# Patient Record
Sex: FEMALE | Race: WHITE | NOT HISPANIC OR LATINO | Employment: FULL TIME | URBAN - METROPOLITAN AREA
[De-identification: names, ages, dates, MRNs, and addresses within clinical notes are randomized per-mention and may not be internally consistent; named-entity substitution may affect disease eponyms.]

---

## 2018-05-29 ENCOUNTER — TRANSCRIBE ORDERS (OUTPATIENT)
Dept: PHYSICAL THERAPY | Facility: CLINIC | Age: 38
End: 2018-05-29

## 2018-05-29 ENCOUNTER — EVALUATION (OUTPATIENT)
Dept: PHYSICAL THERAPY | Facility: CLINIC | Age: 38
End: 2018-05-29
Payer: COMMERCIAL

## 2018-05-29 DIAGNOSIS — F07.81 POST CONCUSSION SYNDROME: Primary | ICD-10-CM

## 2018-05-29 DIAGNOSIS — F07.81 POSTCONCUSSION SYNDROME: Primary | ICD-10-CM

## 2018-05-29 DIAGNOSIS — R42 DIZZINESS: ICD-10-CM

## 2018-05-29 PROCEDURE — G8978 MOBILITY CURRENT STATUS: HCPCS | Performed by: PHYSICAL THERAPIST

## 2018-05-29 PROCEDURE — 97162 PT EVAL MOD COMPLEX 30 MIN: CPT | Performed by: PHYSICAL THERAPIST

## 2018-05-29 PROCEDURE — G8979 MOBILITY GOAL STATUS: HCPCS | Performed by: PHYSICAL THERAPIST

## 2018-05-29 RX ORDER — TOPIRAMATE 100 MG/1
100 TABLET, FILM COATED ORAL DAILY
COMMUNITY
End: 2019-02-21 | Stop reason: ALTCHOICE

## 2018-05-29 RX ORDER — NAPROXEN 250 MG/1
250 TABLET ORAL 2 TIMES DAILY PRN
COMMUNITY
End: 2019-02-21 | Stop reason: ALTCHOICE

## 2018-05-29 NOTE — LETTER
May 29, 2018    Josué Gonzalez  [address]  [facility] [address]    Patient: Edvin Deleon   YOB: 1980   Date of Visit: 2018     Encounter Diagnosis     ICD-10-CM    1  Post concussion syndrome F07 81    2  Amanda Daniel        Dear Dr Jesus Kennedy:    Please review the attached Plan of Care from [facility] recent visit  Please verify that you agree therapy should continue by signing the attached document and sending it back to our office  If you have any questions or concerns, please don't hesitate to call  Sincerely,    Nina Chahal, PT      Referring Provider:      I certify that I have read the below Plan of Care and certify the need for these services furnished under this plan of treatment while under my care  Josué Carlos  [address] [place] [address]  VIA Facsimile: [phone]          PT Evaluation     Today's date: 2018  Patient name: Edvin Deleon  : 1980  MRN: [id number]  Referring provider: Edyta Irwin  Dx:   Encounter Diagnosis     ICD-10-CM    1  Post concussion syndrome F07 81    2  Dizziness R42                   Assessment  Impairments: abnormal or restricted ROM, impaired balance, lacks appropriate home exercise program and pain with function  Other impairment: oculomotor and convergence deficits    Assessment details: Pt is a 44y o  year old female coming to outpatient PT with a diagnosis of post concussion syndrome with dizziness  Pt presents with increased HA, neck pain with R UE radicular sx, decreased balance, decreased convergence, (+) oculomotor deficits and overall decreased functional mobility  Pt would benefit from skilled PT services in order to address these deficits and reach maximum level of function  Thank you kindly for the referral!  Will assess inner ear NV  Pt fatigued and developed a HA during oculomotor and balance testing  Understanding of Dx/Px/POC: good   Prognosis: good    Goals  STG's ( 3-4 weeks)  1  Pt will be independent in HEP  2  Improve convergence ability to 6-8 cm  LTG's ( 6- 8 weeks)  1  Improve FOTO score by 10-12 points  2  Decrease neck pain to 1-2/10  3  Improve balance demonstrated by decreased CT- SIB testing  4  Pt will have improved function on oculomotor testing with less sx    Plan  Patient would benefit from: PT eval and skilled physical therapy  Planned modality interventions: TENS, cryotherapy and thermotherapy: hydrocollator packs  Planned therapy interventions: joint mobilization, manual therapy, neuromuscular re-education, functional ROM exercises, flexibility, balance, strengthening, therapeutic exercise and home exercise program  Frequency: 2x week  Duration in weeks: 8  Treatment plan discussed with: patient        Subjective Evaluation    History of Present Illness  Mechanism of injury: Pt reports about a month ago, a piece of a silo fell down and hit her on the head  Pt went to the ER and had a tetanus shot and 13 staples in her head  Pt got up the next day and felt foggy  Pt went to her PCP and was diagnosed with a concussion  Pt was tired, had loss of appetite, loss of energy, HA, decreased balance, neck pain, sleep disturbances and a heavy feeling in her head  Pt has been out of work for one month  Pt has been avoiding screens  Pt has been avoiding reading, and has difficulty focusing and helping her children with their homework  Pt gets a HA with eye movements  Pt had a CAT scan done on 5/11/18  Pt has occasional tingling in R finger tips and elbow  Pt gets tired when going to the grocery store for an hour  Pt started local driving  Pt has photo and phonophoria  Pt's head feels tired and she is not able to sit up unsupported for long time periods  HA's are intermittent, behind her eyes and into her forehead, piercing, 30 minutes in duration  Relief with manual pressure on her head    Work: ; currently not working  Hobbies: exercising at [employer], running, camping, outside activities, gardening  Gait: no abnormalities  Pain  At best pain rating: 3  At worst pain ratin  Location: headache/ neck pain  Quality: dull ache    Social Support  Steps to enter house: yes  3  Stairs in house: yes   10  Lives in: multiple-level home  Lives with: spouse    Treatments  Current treatment: physical therapy  Patient Goals  Patient goal: to regain her balance, decrease her " heavy headedness", decrease fatigue        Objective     Neurological Testing     Sensation   Cervical/Thoracic   Left   Intact: light touch    Right   Intact: light touch    Reflexes   Left   Biceps (C5/C6): normal (2+)  Brachioradialis (C6): normal (2+)  Triceps (C7): normal (2+)    Right   Biceps (C5/C6): normal (2+)  Brachioradialis (C6): normal (2+)  Triceps (C7): normal (2+)    Active Range of Motion   Cervical/Thoracic Spine   Cervical    Flexion: 22 degrees   Extension: 65 degrees   Left lateral flexion: 15 degrees   Right lateral flexion: 15 degrees   Left rotation: 65 degrees   Right rotation: 55 degrees     Additional Active Range of Motion Details  Pt tightness and stretching in cervical spine  Posture: pt sits with rounded shoulders/ forward head position    General Comments     Cervical/Thoracic Comments  Vestibular Examination:    Primary Complaints: dizziness when getting up in the morning, getting out of the shower, when doing a lot of activity/ helping her kids get ready for school, difficulty falling asleep and staying asleep, tinnitus in ears, popping in ears, decreased short term memory   Pt finds she needs to write things down to remember    Exacerbating Factors: motion sickness when riding in the car, nausea, looking up gives HA    Relieving Factors: sitting, resting    Modified CT SIB testing:  EO on firm surface:  2 89  sway index  EC on firm surface: 4 89  sway index  EO on foam surface:  2 34 sway index  EC on form surface:  4 05 sway index    Spontaneous nystagmus room light: normal  Gaze holding nystagmus room light: normal  Skew deviation room light: negative  Smooth pursuits:  vertical:   15 seconds; difficulty to perform; HA   horizontal: 30 seconds; " hard to focus" , slow speed  Horizontal head thrust test: (+) Bilateraly; difficulty staying on target  VOR cancellation: 5 seconds  Near point convergence: 8 cm with headache ( normal 4- 6 cm)  Oculomotor mobility: fair  Sharp Adryan test: negative  Vertebral Artery Test: negaive  [place] Hallpike test: assess NV          Dx: post concussion syndrome   EPOC: 07/24/18  CO-MORBIDITIES: none  PERSONAL FACTORS: special , HA, dizziness,  Precautions: R UE radiculopathy; consider mechanical traction    Daily Treatment Diary     Manual              Cervical distraction             B UT, levator scap, rhomboid MFR                                                        Exercise Diary                           BW UBE             TB rows             TB LPD                          Sidestepping on foam             Tandem walking             Rhomberg EO foam             Rhomberg EC on level surface                          VOR x1 horiz  Plain bg            VOR x1 vert Plain bg            Imaginary targets             Targets: with and without stopping                                                                                               Modalities

## 2018-05-29 NOTE — PROGRESS NOTES
PT Evaluation     Today's date: 2018  Patient name: Rosalyn Eisenmenger  : 1980  MRN: [id number]  Referring provider: Bandar Quesada  Dx:   Encounter Diagnosis     ICD-10-CM    1  Post concussion syndrome F07 81    2  Dizziness R42                   Assessment  Impairments: abnormal or restricted ROM, impaired balance, lacks appropriate home exercise program and pain with function  Other impairment: oculomotor and convergence deficits    Assessment details: Pt is a 44y o  year old female coming to outpatient PT with a diagnosis of post concussion syndrome with dizziness  Pt presents with increased HA, neck pain with R UE radicular sx, decreased balance, decreased convergence, (+) oculomotor deficits and overall decreased functional mobility  Pt would benefit from skilled PT services in order to address these deficits and reach maximum level of function  Thank you kindly for the referral!  Will assess inner ear NV  Pt fatigued and developed a HA during oculomotor and balance testing  Understanding of Dx/Px/POC: good   Prognosis: good    Goals  STG's ( 3-4 weeks)  1  Pt will be independent in HEP  2  Improve convergence ability to 6-8 cm  LTG's ( 6- 8 weeks)  1  Improve FOTO score by 10-12 points  2  Decrease neck pain to 1-2/10  3  Improve balance demonstrated by decreased CT- SIB testing  4   Pt will have improved function on oculomotor testing with less sx    Plan  Patient would benefit from: PT eval and skilled physical therapy  Planned modality interventions: TENS, cryotherapy and thermotherapy: hydrocollator packs  Planned therapy interventions: joint mobilization, manual therapy, neuromuscular re-education, functional ROM exercises, flexibility, balance, strengthening, therapeutic exercise and home exercise program  Frequency: 2x week  Duration in weeks: 8  Treatment plan discussed with: patient        Subjective Evaluation    History of Present Illness  Mechanism of injury: Pt reports about a month ago, a piece of a silo fell down and hit her on the head  Pt went to the ER and had a tetanus shot and 13 staples in her head  Pt got up the next day and felt foggy  Pt went to her PCP and was diagnosed with a concussion  Pt was tired, had loss of appetite, loss of energy, HA, decreased balance, neck pain, sleep disturbances and a heavy feeling in her head  Pt has been out of work for one month  Pt has been avoiding screens  Pt has been avoiding reading, and has difficulty focusing and helping her children with their homework  Pt gets a HA with eye movements  Pt had a CAT scan done on 18  Pt has occasional tingling in R finger tips and elbow  Pt gets tired when going to the grocery store for an hour  Pt started local driving  Pt has photo and phonophoria  Pt's head feels tired and she is not able to sit up unsupported for long time periods  HA's are intermittent, behind her eyes and into her forehead, piercing, 30 minutes in duration  Relief with manual pressure on her head    Work: ; currently not working  Hobbies: exercising at [employer], running, camping, outside activities, gardening  Gait: no abnormalities  Pain  At best pain rating: 3  At worst pain ratin  Location: headache/ neck pain  Quality: dull ache    Social Support  Steps to enter house: yes  3  Stairs in house: yes   10  Lives in: multiple-level home  Lives with: spouse    Treatments  Current treatment: physical therapy  Patient Goals  Patient goal: to regain her balance, decrease her " heavy headedness", decrease fatigue        Objective     Neurological Testing     Sensation   Cervical/Thoracic   Left   Intact: light touch    Right   Intact: light touch    Reflexes   Left   Biceps (C5/C6): normal (2+)  Brachioradialis (C6): normal (2+)  Triceps (C7): normal (2+)    Right   Biceps (C5/C6): normal (2+)  Brachioradialis (C6): normal (2+)  Triceps (C7): normal (2+)    Active Range of Motion   Cervical/Thoracic Spine Cervical    Flexion: 22 degrees   Extension: 65 degrees   Left lateral flexion: 15 degrees   Right lateral flexion: 15 degrees   Left rotation: 65 degrees   Right rotation: 55 degrees     Additional Active Range of Motion Details  Pt tightness and stretching in cervical spine  Posture: pt sits with rounded shoulders/ forward head position    General Comments     Cervical/Thoracic Comments  Vestibular Examination:    Primary Complaints: dizziness when getting up in the morning, getting out of the shower, when doing a lot of activity/ helping her kids get ready for school, difficulty falling asleep and staying asleep, tinnitus in ears, popping in ears, decreased short term memory   Pt finds she needs to write things down to remember    Exacerbating Factors: motion sickness when riding in the car, nausea, looking up gives HA    Relieving Factors: sitting, resting    Modified CT SIB testing:  EO on firm surface:  2 89  sway index  EC on firm surface: 4 89  sway index  EO on foam surface:  2 34 sway index  EC on form surface:  4 05 sway index    Spontaneous nystagmus room light: normal  Gaze holding nystagmus room light: normal  Skew deviation room light: negative  Smooth pursuits:  vertical:   15 seconds; difficulty to perform; HA   horizontal: 30 seconds; " hard to focus" , slow speed  Horizontal head thrust test: (+) Bilateraly; difficulty staying on target  VOR cancellation: 5 seconds  Near point convergence: 8 cm with headache ( normal 4- 6 cm)  Oculomotor mobility: fair  Sharp Adryan test: negative  Vertebral Artery Test: negaive  [place] Hallpike test: assess NV          Dx: post concussion syndrome   EPOC: 07/24/18  CO-MORBIDITIES: none  PERSONAL FACTORS: special , HA, dizziness,  Precautions: R UE radiculopathy; consider mechanical traction    Daily Treatment Diary     Manual              Cervical distraction             B UT, levator scap, rhomboid MFR Exercise Diary                           BW UBE             TB rows             TB LPD                          Sidestepping on foam             Tandem walking             Rhomberg EO foam             Rhomberg EC on level surface                          VOR x1 horiz  Plain bg            VOR x1 vert Plain bg            Imaginary targets             Targets: with and without stopping                                                                                               Modalities

## 2018-05-31 ENCOUNTER — OFFICE VISIT (OUTPATIENT)
Dept: PHYSICAL THERAPY | Facility: CLINIC | Age: 38
End: 2018-05-31
Payer: COMMERCIAL

## 2018-05-31 DIAGNOSIS — R42 DIZZINESS: ICD-10-CM

## 2018-05-31 DIAGNOSIS — F07.81 POST CONCUSSION SYNDROME: Primary | ICD-10-CM

## 2018-05-31 PROCEDURE — 97140 MANUAL THERAPY 1/> REGIONS: CPT

## 2018-05-31 PROCEDURE — 97010 HOT OR COLD PACKS THERAPY: CPT

## 2018-05-31 PROCEDURE — 97110 THERAPEUTIC EXERCISES: CPT

## 2018-05-31 NOTE — PROGRESS NOTES
Daily Note     Today's date: 2018  Patient name: Markham Ormond  : 1980  MRN: [id number]  Referring provider: Theophilus Sandhoff  Dx:   Encounter Diagnosis     ICD-10-CM    1  Post concussion syndrome F07 81    2  Dizziness R42                   Subjective: Pt reports she has had a HA for the last 2 days 6/10  States activity w/ HA causes dizziness  Objective: See treatment diary below      Assessment: Tolerated treatment fair  Limited ex's due to HA this visit  Pt reports HA down to 1/10 post       Plan: Continue per plan of care  Progress treatment as tolerated      Dx: post concussion syndrome   EPOC: 18  CO-MORBIDITIES: none  PERSONAL FACTORS: special , HA, dizziness,  Precautions: R UE radiculopathy; consider mechanical traction    Daily Treatment Diary     Manual              Cervical distraction JK            B UT, levator scap, rhomboid MFR JK                                       '                Exercise Diary                           BW UBE 3 retro            TB rows OTB 10x            TB LPD OTB  x10                         Sidestepping on foam             Tandem walking             Rhomberg EO foam             Rhomberg EC on level surface                          VOR x1 horiz  Plain bg            VOR x1 vert Plain bg            Imaginary targets             Targets: with and without stopping                                                                                               Modalities              CP  supine 10'

## 2018-06-05 ENCOUNTER — OFFICE VISIT (OUTPATIENT)
Dept: PHYSICAL THERAPY | Facility: CLINIC | Age: 38
End: 2018-06-05
Payer: COMMERCIAL

## 2018-06-05 DIAGNOSIS — F07.81 POST CONCUSSION SYNDROME: Primary | ICD-10-CM

## 2018-06-05 DIAGNOSIS — R42 DIZZINESS: ICD-10-CM

## 2018-06-05 PROCEDURE — 97112 NEUROMUSCULAR REEDUCATION: CPT

## 2018-06-05 PROCEDURE — 97010 HOT OR COLD PACKS THERAPY: CPT

## 2018-06-05 PROCEDURE — 97140 MANUAL THERAPY 1/> REGIONS: CPT

## 2018-06-05 NOTE — PROGRESS NOTES
Daily Note     Today's date: 2018  Patient name: Marialuisa Ingram  : 1980  MRN: [id number]  Referring provider: Davonte Blackburn  Dx:   Encounter Diagnosis     ICD-10-CM    1  Post concussion syndrome F07 81    2  Dizziness R42                   Subjective: Reports weekend following she felt like she had " a hangover"  No c/o's HA today just fogginess  Objective: See treatment diary below  Added VOR ex's  Assessment: Tolerated treatment fair  Pt had difficulty following direction for VOR ex's  Pt challenged by balance ex's  Plan: Continue per plan of care  Progress treatment as tolerated      Dx: post concussion syndrome   EPOC: 18  CO-MORBIDITIES: none  PERSONAL FACTORS: special , HA, dizziness,  Precautions: R UE radiculopathy; consider mechanical traction    Daily Treatment Diary     Manual             Cervical distraction JK JK           B UT, levator scap, rhomboid MFR JK JK                                      25'                Exercise Diary                          BW UBE 3 retro 3 retro           TB rows OTB 10x OTB  10x           TB LPD OTB  x10 OTB  10x                        Sidestepping on foam  2 laps           Tandem walking  2 laps           Rhomberg EO foam  30"x2           Rhomberg EC on level surface  15"x2                        VOR x1 horiz  Plain bg 30"x1           VOR x1 vert Plain bg 30"x1           Imaginary targets             Targets: with and without stopping                                                                                               Modalities             CP  supine 10' 10'

## 2018-06-07 ENCOUNTER — EVALUATION (OUTPATIENT)
Dept: PHYSICAL THERAPY | Facility: CLINIC | Age: 38
End: 2018-06-07
Payer: COMMERCIAL

## 2018-06-07 DIAGNOSIS — R42 DIZZINESS: ICD-10-CM

## 2018-06-07 DIAGNOSIS — F07.81 POST CONCUSSION SYNDROME: Primary | ICD-10-CM

## 2018-06-07 PROCEDURE — 97110 THERAPEUTIC EXERCISES: CPT | Performed by: PHYSICAL THERAPIST

## 2018-06-07 PROCEDURE — 97112 NEUROMUSCULAR REEDUCATION: CPT | Performed by: PHYSICAL THERAPIST

## 2018-06-07 PROCEDURE — 97140 MANUAL THERAPY 1/> REGIONS: CPT | Performed by: PHYSICAL THERAPIST

## 2018-06-07 NOTE — PROGRESS NOTES
Daily Note     Today's date: 2018  Patient name: Antolin Barone  : 1980  MRN: [id number]  Referring provider: Ivana Manning  Dx:   Encounter Diagnosis     ICD-10-CM    1  Post concussion syndrome F07 81    2  Dizziness R42                   Subjective: Pt woke up with a mild HA on the R side of her temple area of her head  Pt had increased " fogginess" after being at an end of the year pool  2-3/10 post tx      Objective: See treatment diary below      Assessment: Tolerated treatment fair  Patient demonstrated fatigue post treatment      Plan: Continue per plan of care         Dx: post concussion syndrome   EPOC: 18  CO-MORBIDITIES: none  PERSONAL FACTORS: special , HA, dizziness,  Precautions: R UE radiculopathy; consider mechanical traction    Daily Treatment Diary     Manual            Cervical distraction JK JK th          B UT, levator scap, rhomboid MFR JK JK                                      25'                Exercise Diary                         BW UBE 3 retro 3 retro 3'           TB rows OTB 10x OTB  10x OTB 2x10          TB LPD OTB  x10 OTB  10x OTB 2x10          Supine chin tuck   10          Sidestepping on foam  2 laps           Tandem walking  2 laps           Rhomberg EO foam  30"x2 30"x2          Rhomberg EC on level surface  15"x2 15"x2                       VOR x1 horiz  Plain bg 30"x1 15"x2          VOR x1 vert Plain bg 30"x1 15"x2          Imaginary targets   15"x2          Targets: with and without stopping   x5 ea                                                                                            Modalities             CP  supine 10' 10'

## 2018-06-12 ENCOUNTER — OFFICE VISIT (OUTPATIENT)
Dept: PHYSICAL THERAPY | Facility: CLINIC | Age: 38
End: 2018-06-12
Payer: COMMERCIAL

## 2018-06-12 DIAGNOSIS — R42 DIZZINESS: ICD-10-CM

## 2018-06-12 DIAGNOSIS — F07.81 POST CONCUSSION SYNDROME: Primary | ICD-10-CM

## 2018-06-12 PROCEDURE — 97112 NEUROMUSCULAR REEDUCATION: CPT | Performed by: PHYSICAL THERAPIST

## 2018-06-12 PROCEDURE — 97140 MANUAL THERAPY 1/> REGIONS: CPT | Performed by: PHYSICAL THERAPIST

## 2018-06-12 NOTE — PROGRESS NOTES
Daily Note     Today's date: 2018  Patient name: Michael Sotelo  : 1980  MRN: [id number]  Referring provider: Adriano Narayan  Dx:   Encounter Diagnosis     ICD-10-CM    1  Post concussion syndrome F07 81    2  Dizziness R42                   Subjective: Pt reports she is pushing herself to do more, but she is " feeling it"  Pt drove a farther distance to the bank this week and did some other errands in the am, but then she felt more " heavy headed"  Pt reports less R UE radicular sx and less face pain      Objective: See treatment diary below      Assessment: Tolerated treatment well  Patient demonstrated fatigue post treatment  Pt has noticed short term memory problems with activities at home when in conversation  Pt continues to be challenged with oculomotor ex and gets eye pain/ temple pain  " Ice cream HA"  Plan: Continue per plan of care   Try starting with balance ex NV    Dx: post concussion syndrome   EPOC: 18  CO-MORBIDITIES: none  PERSONAL FACTORS: special , HA, dizziness,  Precautions: R UE radiculopathy; consider mechanical traction    Daily Treatment Diary     Manual           Cervical distraction Burton Keel th th         B UT, levator scap, rhomboid MFR JK JK  th                                    25'                Exercise Diary                        BW UBE 3 retro 3 retro 3'  3' BW         TB rows OTB 10x OTB  10x OTB 2x10 OTB 2x10         TB LPD OTB  x10 OTB  10x OTB 2x10 OTB 2x10         Supine chin tuck   10 10         Sidestepping on foam  2 laps           Tandem walking  2 laps           Rhomberg EO foam  30"x2 30"x2          Rhomberg EC on level surface  15"x2 15"x2                       VOR x1 horiz  Plain bg 30"x1 15"x2 15" x2         VOR x1 vert Plain bg 30"x1 15"x2 15" x2         Imaginary targets   15"x2 15 " x2         Targets: with and without stopping   x5 ea x5 Modalities  5/31 6/5           CP  supine 10' 10'

## 2018-06-14 ENCOUNTER — OFFICE VISIT (OUTPATIENT)
Dept: PHYSICAL THERAPY | Facility: CLINIC | Age: 38
End: 2018-06-14
Payer: COMMERCIAL

## 2018-06-14 DIAGNOSIS — R42 DIZZINESS: ICD-10-CM

## 2018-06-14 DIAGNOSIS — F07.81 POST CONCUSSION SYNDROME: Primary | ICD-10-CM

## 2018-06-14 PROCEDURE — 97112 NEUROMUSCULAR REEDUCATION: CPT

## 2018-06-14 PROCEDURE — 97140 MANUAL THERAPY 1/> REGIONS: CPT

## 2018-06-14 PROCEDURE — 97110 THERAPEUTIC EXERCISES: CPT

## 2018-06-14 NOTE — PROGRESS NOTES
Daily Note     Today's date: 2018  Patient name: Devon Diego  : 1980  MRN: [id number]  Referring provider: Tyler De La Torre  Dx:   Encounter Diagnosis     ICD-10-CM    1  Post concussion syndrome F07 81    2  Dizziness R42          Subjective:   Patient reported no new complaints  Objective: See treatment diary below      Assessment: Patient demonstrated good tolerance to performing balance exercises first, MT, then UBE and bands with no complaints at end of treatment  Discussed with patient to perform eye exercises at home depending on how she feels throughout the rest of the day  MFR helped to decrease fascia restrictions  Patient would benefit from continued PT      Plan: Continue per plan of care  Patient will monitor how she feels and let therapist know next visit       Dx: post concussion syndrome   EPOC: 18  CO-MORBIDITIES: none  PERSONAL FACTORS: special , HA, dizziness,  Precautions: R UE radiculopathy; consider mechanical traction    Daily Treatment Diary     Manual          Cervical distraction Johanny Barros  th af        B UT, levator scap, rhomboid MFR [facility] Af                                    25'                Exercise Diary                       BW UBE 3 retro 3 retro 3'  3' BW 3' BW        TB rows OTB 10x OTB  10x OTB 2x10 OTB 2x10 OTB 2x10        TB LPD OTB  x10 OTB  10x OTB 2x10 OTB 2x10 OTB 2x10        Supine chin tuck   10 10         Sidestepping on foam  2 laps   2 laps        Tandem walking  2 laps   2 laps        Rhomberg EO foam  30"x2 30"x2  30"x2        Rhomberg EC on level surface  15"x2 15"x2  15"x2                     VOR x1 horiz  Plain bg 30"x1 15"x2 15" x2         VOR x1 vert Plain bg 30"x1 15"x2 15" x2         Imaginary targets   15"x2 15 " x2         Targets: with and without stopping   x5 ea x5                                                                                           Modalities   CP  supine 10' 10'

## 2018-06-19 ENCOUNTER — OFFICE VISIT (OUTPATIENT)
Dept: PHYSICAL THERAPY | Facility: CLINIC | Age: 38
End: 2018-06-19
Payer: COMMERCIAL

## 2018-06-19 DIAGNOSIS — F07.81 POST CONCUSSION SYNDROME: Primary | ICD-10-CM

## 2018-06-19 DIAGNOSIS — R42 DIZZINESS: ICD-10-CM

## 2018-06-19 PROCEDURE — 97110 THERAPEUTIC EXERCISES: CPT

## 2018-06-19 PROCEDURE — 97112 NEUROMUSCULAR REEDUCATION: CPT

## 2018-06-19 PROCEDURE — 97140 MANUAL THERAPY 1/> REGIONS: CPT

## 2018-06-19 NOTE — PROGRESS NOTES
Daily Note     Today's date: 2018  Patient name: Devon Diego  : 1980  MRN: [id number]  Referring provider: Tyler De La Torre  Dx:   Encounter Diagnosis     ICD-10-CM    1  Post concussion syndrome F07 80    2  Dizziness R42                   Subjective: Patient noted that she has a pressure feeling on forehead superior lateral to B eyes that comes and goes  Patient noted that she has a neurologist appointment scheduled for after therapy today  Patient noted no change in sx's or pressure on forehead post treatment  Objective: See treatment diary below      Assessment: Patient had some questions on oculomotor exercises therefore reviewed exercises in treatment  After patient performed vertical VORx1 she noted slight neck pain  Trailed balance exercises first due to patient noting that she felt ok after last treatment with exercises and order of exercises  Trailed adding oculomotor exercises back into treatment  Patient would benefit from continued PT      Plan: Continue per plan of care  Patient will monitor and let therapist know how she feels NV       Dx: post concussion syndrome   EPOC: 18  CO-MORBIDITIES: none  PERSONAL FACTORS: special , HA, dizziness,  Precautions: R UE radiculopathy; consider mechanical traction    Daily Treatment Diary     Manual         Cervical distraction JK JK th th af af       B UT, levator scap, rhomboid MFR JK JK th th Af  af                                  25'                Exercise Diary                      BW UBE 3 retro 3 retro 3'  3' BW 3' BW        TB rows OTB 10x OTB  10x OTB 2x10 OTB 2x10 OTB 2x10        TB LPD OTB  x10 OTB  10x OTB 2x10 OTB 2x10 OTB 2x10        Supine chin tuck   10 10         Sidestepping on foam  2 laps   2 laps 3 laps       Tandem walking  2 laps   2 laps 3laps       Rhomberg EO foam  30"x2 30"x2  30"x2 30"x2       Rhomberg EC on level surface  15"x2 15"x2  15"x2 15"x2                    VOR x1 horiz  Plain bg 30"x1 15"x2 15" x2  15"x2       VOR x1 vert Plain bg 30"x1 15"x2 15" x2  15"x2       Imaginary targets   15"x2 15 " x2  15''x2       Targets: with and without stopping   x5 ea x5                                                                                           Modalities  5/31 6/5           CP  supine 10' 10'

## 2018-06-21 ENCOUNTER — OFFICE VISIT (OUTPATIENT)
Dept: PHYSICAL THERAPY | Facility: CLINIC | Age: 38
End: 2018-06-21
Payer: COMMERCIAL

## 2018-06-21 DIAGNOSIS — R42 DIZZINESS: ICD-10-CM

## 2018-06-21 DIAGNOSIS — F07.81 POST CONCUSSION SYNDROME: Primary | ICD-10-CM

## 2018-06-21 PROCEDURE — 97140 MANUAL THERAPY 1/> REGIONS: CPT

## 2018-06-21 PROCEDURE — 97112 NEUROMUSCULAR REEDUCATION: CPT

## 2018-06-21 PROCEDURE — 97012 MECHANICAL TRACTION THERAPY: CPT

## 2018-06-21 NOTE — PROGRESS NOTES
Daily Note     Today's date: 2018  Patient name: Regine Lane  : 1980  MRN: [id number]  Referring provider: Charles Feliz  Dx:   Encounter Diagnosis     ICD-10-CM    1  Post concussion syndrome F07 80    2  Dizziness R42                   Subjective: Pt reports she did not get the answers she needed from her last neurologist appt  Reports when reading w/ her son she gets a HA from looking down and then gets tingling in the face and a sharp pain at the temples  States if she pushes herself too hard she is spent for the rest of the day and then next feels "like a hang over"  Objective: See treatment diary below  Trial mech tx  Assessment: Tolerated treatment fair  Patient c/o dizziness w/ VOR ex's  Pt challenged w/ VOR ex's today  Performs small AROM w/ head turns  Plan: Continue per plan of care  Progress treatment as tolerated      Dx: post concussion syndrome   EPOC: 18  CO-MORBIDITIES: none  PERSONAL FACTORS: special , HA, dizziness,  Precautions: R UE radiculopathy; consider mechanical traction    Daily Treatment Diary     Manual        Cervical distraction JK JK th th af af JK      B UT, levator scap, rhomboid MFR JK JK th th Af  af JK                                 25'      20'          Exercise Diary                     BW UBE 3 retro 3 retro 3'  3' BW 3' BW  3'      TB rows OTB 10x OTB  10x OTB 2x10 OTB 2x10 OTB 2x10  OTB  20      TB LPD OTB  x10 OTB  10x OTB 2x10 OTB 2x10 OTB 2x10  OTB  20      Supine chin tuck   10 10         Sidestepping on foam  2 laps   2 laps 3 laps 3 laps      Tandem walking  2 laps   2 laps 3laps 3 laps      Rhomberg EO foam  30"x2 30"x2  30"x2 30"x2       Rhomberg EC on level surface  15"x2 15"x2  15"x2 15"x2                    VOR x1 horiz  Plain bg 30"x1 15"x2 15" x2  15"x2 30"x2      VOR x1 vert Plain bg 30"x1 15"x2 15" x2  15"x2 30"x2      Imaginary targets   15"x2 15 " x2  15''x2       Targets: with and without stopping   x5 ea x5                                                                                           Modalities  5/31 6/5 6/21          CP  supine 10' 10'           mech TX cerv   5'x2

## 2018-06-26 ENCOUNTER — OFFICE VISIT (OUTPATIENT)
Dept: PHYSICAL THERAPY | Facility: CLINIC | Age: 38
End: 2018-06-26
Payer: COMMERCIAL

## 2018-06-26 DIAGNOSIS — F07.81 POST CONCUSSION SYNDROME: Primary | ICD-10-CM

## 2018-06-26 DIAGNOSIS — R42 DIZZINESS: ICD-10-CM

## 2018-06-26 PROCEDURE — G8978 MOBILITY CURRENT STATUS: HCPCS | Performed by: PHYSICAL THERAPIST

## 2018-06-26 PROCEDURE — 97110 THERAPEUTIC EXERCISES: CPT | Performed by: PHYSICAL THERAPIST

## 2018-06-26 PROCEDURE — G8979 MOBILITY GOAL STATUS: HCPCS | Performed by: PHYSICAL THERAPIST

## 2018-06-26 PROCEDURE — 97112 NEUROMUSCULAR REEDUCATION: CPT | Performed by: PHYSICAL THERAPIST

## 2018-06-26 PROCEDURE — 97140 MANUAL THERAPY 1/> REGIONS: CPT | Performed by: PHYSICAL THERAPIST

## 2018-06-26 NOTE — PROGRESS NOTES
Daily Note     Today's date: 2018  Patient name: Devon Diego  : 1980  MRN: [id number]  Referring provider: Tyler De La Torre  Dx:   Encounter Diagnosis     ICD-10-CM    1  Post concussion syndrome F07 80    2  Dizziness R42                   Subjective: Pt drove 7 miles to her work last week, and started to get eye pain  Pt is gradually starting to resume more activities at home and with her children, however she feels exhausted at the end of the day and goes to bed around 6:30/ 7pm  Pt was busy last week, Monday through Thursday with activities with her children, and felt like she needed to " crash" on Friday  Pt notes increased difficulty focusing her eyes when reading to her son  Pt was at another pool party with her son and lasted about 2 hours, before she needed to return home  Objective: See treatment diary below      Assessment: Tolerated treatment fair  Patient demonstrated fatigue post treatment      Plan: Continue per plan of care       Dx: post concussion syndrome   EPOC: 18  CO-MORBIDITIES: none  PERSONAL FACTORS: special , HA, dizziness,  Precautions: R UE radiculopathy; consider mechanical traction    Daily Treatment Diary     Manual       Cervical distraction JK JK th th af af JK th     B UT, levator scap, rhomboid MFR JK JK th th Af  af JK th     Manual stretching c spine        th                   25'      20'          Exercise Diary                    BW UBE 3 retro 3 retro 3'  3' BW 3' BW  3' 3'     TB rows OTB 10x OTB  10x OTB 2x10 OTB 2x10 OTB 2x10  OTB  20 OTB 20     TB LPD OTB  x10 OTB  10x OTB 2x10 OTB 2x10 OTB 2x10  OTB  20 OTB x20     Supine chin tuck   10 10         Sidestepping on foam  2 laps   2 laps 3 laps 3 laps 3 laps     Tandem walking  2 laps   2 laps 3laps 3 laps 3 laps     Rhomberg EO foam  30"x2 30"x2  30"x2 30"x2       Rhomberg EC on level surface  15"x2 15"x2  15"x2 15"x2 VOR x1 horiz  Plain bg 30"x1 15"x2 15" x2  15"x2 30"x2      VOR x1 vert Plain bg 30"x1 15"x2 15" x2  15"x2 30"x2      Imaginary targets   15"x2 15 " x2  15''x2       Targets: with and without stopping   x5 ea x5                                                                                           Modalities  5/31 6/5 6/21          CP  supine 10' 10'           mech TX cerv   5'x2

## 2018-06-27 ENCOUNTER — TELEPHONE (OUTPATIENT)
Dept: NEUROLOGY | Facility: CLINIC | Age: 38
End: 2018-06-27

## 2018-06-28 ENCOUNTER — OFFICE VISIT (OUTPATIENT)
Dept: PHYSICAL THERAPY | Facility: CLINIC | Age: 38
End: 2018-06-28
Payer: COMMERCIAL

## 2018-06-28 DIAGNOSIS — F07.81 POST CONCUSSION SYNDROME: Primary | ICD-10-CM

## 2018-06-28 DIAGNOSIS — R42 DIZZINESS: ICD-10-CM

## 2018-06-28 PROCEDURE — 97140 MANUAL THERAPY 1/> REGIONS: CPT

## 2018-06-28 PROCEDURE — 97112 NEUROMUSCULAR REEDUCATION: CPT

## 2018-06-28 NOTE — PROGRESS NOTES
Daily Note     Today's date: 2018  Patient name: Mitzi Nicholson  : 1980  MRN: [id number]  Referring provider: Griffin Bernard  Dx:   Encounter Diagnosis     ICD-10-CM    1  Post concussion syndrome F07 80    2  Dizziness R42                   Subjective: Pt reports she still gets neck pain and the occasional tingling in her face  Reports concerned about trigger pt injections  Objective: See treatment diary below  Gave pt 3 words to remember at end of session  Assessment: Tolerated treatment fair  Patient demonstrated fatigue post treatment and would benefit from continued PT  Pt still having trouble following directions  Pt remembered 2/3 words given to her in beginning of session  Plan: Continue per plan of care     Dx: post concussion syndrome   EPOC: 18  CO-MORBIDITIES: none  PERSONAL FACTORS: special , HA, dizziness,  Precautions: R UE radiculopathy; consider mechanical traction    Daily Treatment Diary     Manual   6    Cervical distraction JK JK th th af af JK th Jk    B UT, levator scap, rhomboid MFR JK JK th th Af  af JK th JK    Manual stretching c spine        th                   25'      20'  15'        Exercise Diary                   BW UBE 3 retro 3 retro 3'  3' BW 3' BW  3' 3' 4'    TB rows OTB 10x OTB  10x OTB 2x10 OTB 2x10 OTB 2x10  OTB  20 OTB 20  otb  20    TB LPD OTB  x10 OTB  10x OTB 2x10 OTB 2x10 OTB 2x10  OTB  20 OTB x20 OTB  20x    Supine chin tuck   10 10         Sidestepping on foam  2 laps   2 laps 3 laps 3 laps 3 laps 3 laps    Tandem walking  2 laps   2 laps 3laps 3 laps 3 laps 3 laps    Rhomberg EO foam  30"x2 30"x2  30"x2 30"x2   30"x2    Rhomberg EC on level surface  15"x2 15"x2  15"x2 15"x2   30"x1  30"x1  foam                 VOR x1 horiz  Plain bg 30"x1 15"x2 15" x2  15"x2 30"x2  30"x2    VOR x1 vert Plain bg 30"x1 15"x2 15" x2  15"x2 30"x2  30"x2 Imaginary targets   15"x2 15 " x2  15''x2   30"x2    Targets: with and without stopping   x5 ea x5                                                                                           Modalities  5/31 6/5 6/21 6/28         CP  supine 10' 10'           mech TX cerv   5'x2 5'x2

## 2018-07-03 ENCOUNTER — OFFICE VISIT (OUTPATIENT)
Dept: PHYSICAL THERAPY | Facility: CLINIC | Age: 38
End: 2018-07-03
Payer: COMMERCIAL

## 2018-07-03 DIAGNOSIS — F07.81 POST CONCUSSION SYNDROME: Primary | ICD-10-CM

## 2018-07-03 DIAGNOSIS — R42 DIZZINESS: ICD-10-CM

## 2018-07-03 PROCEDURE — 97012 MECHANICAL TRACTION THERAPY: CPT

## 2018-07-03 PROCEDURE — 97140 MANUAL THERAPY 1/> REGIONS: CPT

## 2018-07-03 PROCEDURE — 97112 NEUROMUSCULAR REEDUCATION: CPT

## 2018-07-03 NOTE — PROGRESS NOTES
Daily Note     Today's date: 7/3/2018  Patient name: Charlene Mendoza  : 1980  MRN: [id number]  Referring provider: Berry Gr  Dx:   Encounter Diagnosis     ICD-10-CM    1  Post concussion syndrome F07 80    2  Dizziness R42                   Subjective: Pt reports she thinks the [facility] Tx might be helping  Reports not having the eye pain as frequently  States she is having tingling yet  Pt states she has had low motivation lately  Objective: See treatment diary below      Assessment: Tolerated treatment fair  Patient sill having trouble w/ direction following  Pt w/ improved balance today  Plan: Continue per plan of care  Progress treatment as tolerated      Dx: post concussion syndrome   EPOC: 18  CO-MORBIDITIES: none  PERSONAL FACTORS: special , HA, dizziness,  Precautions: R UE radiculopathy; consider mechanical traction    Daily Treatment Diary     Manual   6 72   Cervical distraction JK JK th th af af JK th Jk JK   B UT, levator scap, rhomboid MFR JK JK th th Af  af JK th JK JK   Manual stretching c spine        th                   25'      20'  15' 15'       Exercise Diary   6 7/3                BW UBE 3 retro 3 retro 3'  3' BW 3' BW  3' 3' 4' 4'   TB rows OTB 10x OTB  10x OTB 2x10 OTB 2x10 OTB 2x10  OTB  20 OTB 20  otb  20 OTB  20   TB LPD OTB  x10 OTB  10x OTB 2x10 OTB 2x10 OTB 2x10  OTB  20 OTB x20 OTB  20x OTb  20x   Supine chin tuck   10 10         Sidestepping on foam  2 laps   2 laps 3 laps 3 laps 3 laps 3 laps 3 laps   Tandem walking  2 laps   2 laps 3laps 3 laps 3 laps 3 laps 3 laps   Rhomberg EO foam  30"x2 30"x2  30"x2 30"x2   30"x2 30"x2   Rhomberg EC on level surface  15"x2 15"x2  15"x2 15"x2   30"x1  30"x1  foam 30"x2  foam                VOR x1 horiz  Plain bg 30"x1 15"x2 15" x2  15"x2 30"x2  30"x2 30"x2   VOR x1 vert Plain bg 30"x1 15"x2 15" x2  15"x2 30"x2  30"x2 30"x2 Imaginary targets   15"x2 15 " x2  15''x2   30"x2 30"x2   Targets: with and without stopping   x5 ea x5                                                                                           Modalities  5/31 6/5 6/21 6/28 7/3        CP  supine 10' 10'           mech TX cerv   5'x2 5'x2 5'x2

## 2018-07-05 ENCOUNTER — TRANSCRIBE ORDERS (OUTPATIENT)
Dept: PHYSICAL THERAPY | Facility: CLINIC | Age: 38
End: 2018-07-05

## 2018-07-05 ENCOUNTER — OFFICE VISIT (OUTPATIENT)
Dept: PHYSICAL THERAPY | Facility: CLINIC | Age: 38
End: 2018-07-05
Payer: COMMERCIAL

## 2018-07-05 DIAGNOSIS — R20.0 TACTILE ANESTHESIA: ICD-10-CM

## 2018-07-05 DIAGNOSIS — R42 DIZZINESS: ICD-10-CM

## 2018-07-05 DIAGNOSIS — F07.81 POST CONCUSSION SYNDROME: Primary | ICD-10-CM

## 2018-07-05 DIAGNOSIS — R51.9 FACIAL PAIN: ICD-10-CM

## 2018-07-05 PROCEDURE — 97012 MECHANICAL TRACTION THERAPY: CPT | Performed by: PHYSICAL THERAPIST

## 2018-07-05 PROCEDURE — 97110 THERAPEUTIC EXERCISES: CPT | Performed by: PHYSICAL THERAPIST

## 2018-07-05 PROCEDURE — 97140 MANUAL THERAPY 1/> REGIONS: CPT | Performed by: PHYSICAL THERAPIST

## 2018-07-05 NOTE — PROGRESS NOTES
PT Re-Evaluation     Today's date: 2018  Patient name: Rebeca Newman  : 1980  MRN: [id number]  Referring provider: Ching Callejas  Dx:   Encounter Diagnosis     ICD-10-CM    1  Post concussion syndrome F07 80    2  Dizziness R42                   Assessment  Impairments: abnormal or restricted ROM, impaired balance, lacks appropriate home exercise program and pain with function  Other impairment: oculomotor and convergence deficits    Assessment details: Since starting skilled PT, pain levels are decreased at rest, cervical ROM is improving, R UE radicular sx improving, 4 out of 4 balance conditions improving with gradual improvements with oculomotor testing  Recommend pt continue skilled PT  Pt may benefit from a referral to cognitive OT due to dysfunction with reading, convergence, concentrating  Understanding of Dx/Px/POC: good   Prognosis: good    Goals  STG's ( 3-4 weeks)  1  Pt will be independent in HEP- met  2  Improve convergence ability to 6-8 cm- not met  LTG's ( 6- 8 weeks)  1  Improve FOTO score by 10-12 points- partial met  2  Decrease neck pain to 1-2/10- not met  3  Improve balance demonstrated by decreased CT- SIB testing- met  4  Pt will have improved function on oculomotor testing with less sx- partial met    Plan  Patient would benefit from: skilled physical therapy  Planned modality interventions: TENS, cryotherapy, thermotherapy: hydrocollator packs and traction  Planned therapy interventions: joint mobilization, manual therapy, neuromuscular re-education, functional ROM exercises, flexibility, balance, strengthening, therapeutic exercise and home exercise program  Frequency: 2x week  Duration in weeks: 8  Treatment plan discussed with: patient        Subjective Evaluation    History of Present Illness  Mechanism of injury: Pt notes her appetite has improved and is tolerating increased activity around her home   Pt is trying to push through increased activities at home without needing to rest   Light and sound sensitivity is improving, but she is still bothered by certain lights and noise  Pt is trying to read and has difficulty focusing and it gives her a headache  Pt is able to read for 5 minutes or less  Small print can make her vision blurry  Pt notes slight pain in her eye that comes and goes  Pain is lessening in her L eye, but it is still present  Tinnitus sx comes and goes, but is decreased in general   Pt is very concerned about tingling in face  homework  R UE radicular sx are lessening, with tingling in her fingertips    Work: ; currently not working  Hobbies: exercising at [employer], running, camping, outside activities, gardening  Gait: no abnormalities  Pain  At best pain ratin  At worst pain ratin  Location: headache/ neck pain  Quality: dull ache    Social Support  Steps to enter house: yes  3  Stairs in house: yes   10  Lives in: multiple-level home  Lives with: spouse    Treatments  Current treatment: physical therapy  Patient Goals  Patient goal: to regain her balance, decrease her " heavy headedness", decrease fatigue        Objective     Neurological Testing     Sensation   Cervical/Thoracic   Left   Intact: light touch    Right   Intact: light touch    Reflexes   Left   Biceps (C5/C6): normal (2+)  Brachioradialis (C6): normal (2+)  Triceps (C7): normal (2+)    Right   Biceps (C5/C6): normal (2+)  Brachioradialis (C6): normal (2+)  Triceps (C7): normal (2+)    Active Range of Motion   Cervical/Thoracic Spine   Cervical    Flexion: 45 degrees   Extension: 65 degrees with pain  Left lateral flexion: 30 degrees   Right lateral flexion: 29 degrees   Left rotation: WFL  Right rotation: [facility]    Additional Active Range of Motion Details  Pt tightness and stretching in cervical spine  Posture: pt sits with rounded shoulders/ forward head position    General Comments     Cervical/Thoracic Comments  Vestibular Examination:    Primary Complaints: dizziness when bending down to  childrens' toys, getting out of the shower, when doing a lot of activity/ helping her kids get ready for school, facial tingling, tinnitus in ears, popping in ears, decreased short term memory   Pt finds she needs to write things down to remember; L eye pain    Exacerbating Factors: motion sickness when riding in the car, nausea, looking up gives HA    Relieving Factors: sitting, resting    I E: Modified CT SIB testing:  EO on firm surface:  2 89  sway index  EC on firm surface: 4 89  sway index  EO on foam surface:  2 34 sway index  EC on form surface:  4 05 sway index    7/5/18: Modified CT SIB testing  EO on firm surface: 1 98 sway index  EC on firm surface: 2 47 sway index  EO on foam surface: 1 75 sway index  EC on foam surface: 2 64 sway index    Spontaneous nystagmus room light: normal  Gaze holding nystagmus room light: normal  Skew deviation room light: negative  Smooth pursuits:  vertical:   30 seconds;    horizontal: 30 seconds; needed to think about the task  Horizontal head thrust test: (-) caused dizziness  VOR cancellation: 5 seconds  Near point convergence: 8 cm with headache ( normal 4- 6 cm)  Oculomotor mobility: fair  Sharp Adryan test: negative  Vertebral Artery Test: negative  [place]is[place] Hallpike test: negative  Horizontal Roll test: negative          Dx: post concussion syndrome   EPOC: 07/24/18  CO-MORBIDITIES: none  PERSONAL FACTORS: special , HA, dizziness,  Precautions: R UE radiculopathy; consider mechanical traction    Daily Treatment Diary   6/19  Manual  7/5      6/21 6/26 6/28 7/3   Cervical distraction [facility] Jk JK   B UT, levator scap, rhomboid MFR [facility] JK JK   Manual stretching c spine        th                   13'      20'  15' 15'       Exercise Diary  7/5 6/21 6/26 6/28 7/3                BW UBE 4'      3' 3' 4' 4'   TB rows GTB 10x2      OTB  20 OTB 20  otb  20 OTB  20   TB LPD GTB 2  x10      OTB  20 OTB x20 OTB  20x OTb  20x   Supine chin tuck             Sidestepping on foam       3 laps 3 laps 3 laps 3 laps   Tandem walking       3 laps 3 laps 3 laps 3 laps   Rhomberg EO foam         30"x2 30"x2   Rhomberg EC on level surface         30"x1  30"x1  foam 30"x2  foam                VOR x1 horiz          30"x2 30"x2   VOR x1 vert         30"x2 30"x2   Imaginary targets         30"x2 30"x2   Targets: with and without stopping                                                                                               Modalities  7/5            CP  supine             mech TX cerv 5' x2

## 2018-07-05 NOTE — LETTER
2018    [place] [id number]    Patient: Marialuisa Ingram   YOB: 1980   Date of Visit: 2018     Encounter Diagnosis     ICD-10-CM    1  Post concussion syndrome F07 81    2  [address]        Dear Dr Osvaldo Amezcua:    Please review the attached Plan of Care from [facility] recent visit  Please verify that you agree therapy should continue by signing the attached document and sending it back to our office  If you have any questions or concerns, please don't hesitate to call  Sincerely,    Cricket Guerra, PT      Referring Provider:      I certify that I have read the below Plan of Care and certify the need for these services furnished under this plan of treatment while under my care  [place] [id number]  VIA Facsimile: [phone]          PT Re-Evaluation     Today's date: 2018  Patient name: Marialuisa Ingram  : 1980  MRN: [id number]  Referring provider: Davonte Blackburn  Dx:   Encounter Diagnosis     ICD-10-CM    1  Post concussion syndrome F07 81    2  Dizziness R42                   Assessment  Impairments: abnormal or restricted ROM, impaired balance, lacks appropriate home exercise program and pain with function  Other impairment: oculomotor and convergence deficits    Assessment details: Since starting skilled PT, pain levels are decreased at rest, cervical ROM is improving, R UE radicular sx improving, 4 out of 4 balance conditions improving with gradual improvements with oculomotor testing  Recommend pt continue skilled PT  Pt may benefit from a referral to cognitive OT due to dysfunction with reading, convergence, concentrating  Understanding of Dx/Px/POC: good   Prognosis: good    Goals  STG's ( 3-4 weeks)  1  Pt will be independent in HEP- met  2  Improve convergence ability to 6-8 cm- not met  LTG's ( 6- 8 weeks)  1  Improve FOTO score by 10-12 points- partial met  2   Decrease neck pain to 1-2/10- not met  3  Improve balance demonstrated by decreased CT- SIB testing- met  4  Pt will have improved function on oculomotor testing with less sx- partial met    Plan  Patient would benefit from: skilled physical therapy  Planned modality interventions: TENS, cryotherapy, thermotherapy: hydrocollator packs and traction  Planned therapy interventions: joint mobilization, manual therapy, neuromuscular re-education, functional ROM exercises, flexibility, balance, strengthening, therapeutic exercise and home exercise program  Frequency: 2x week  Duration in weeks: 8  Treatment plan discussed with: patient        Subjective Evaluation    History of Present Illness  Mechanism of injury: Pt notes her appetite has improved and is tolerating increased activity around her home  Pt is trying to push through increased activities at home without needing to rest   Light and sound sensitivity is improving, but she is still bothered by certain lights and noise  Pt is trying to read and has difficulty focusing and it gives her a headache  Pt is able to read for 5 minutes or less  Small print can make her vision blurry  Pt notes slight pain in her eye that comes and goes  Pain is lessening in her L eye, but it is still present  Tinnitus sx comes and goes, but is decreased in general   Pt is very concerned about tingling in face  homework  R UE radicular sx are lessening, with tingling in her fingertips    Work: ; currently not working  Hobbies: exercising at [employer], running, camping, outside activities, gardening  Gait: no abnormalities  Pain  At best pain ratin  At worst pain ratin  Location: headache/ neck pain  Quality: dull ache    Social Support  Steps to enter house: yes  3  Stairs in house: yes   10  Lives in: multiple-level home  Lives with: spouse    Treatments  Current treatment: physical therapy  Patient Goals  Patient goal: to regain her balance, decrease her " heavy headedness", decrease fatigue        Objective     Neurological Testing     Sensation   Cervical/Thoracic   Left   Intact: light touch    Right   Intact: light touch    Reflexes   Left   Biceps (C5/C6): normal (2+)  Brachioradialis (C6): normal (2+)  Triceps (C7): normal (2+)    Right   Biceps (C5/C6): normal (2+)  Brachioradialis (C6): normal (2+)  Triceps (C7): normal (2+)    Active Range of Motion   Cervical/Thoracic Spine   Cervical    Flexion: 45 degrees   Extension: 65 degrees with pain  Left lateral flexion: 30 degrees   Right lateral flexion: 29 degrees   Left rotation: WFL  Right rotation: [facility]    Additional Active Range of Motion Details  Pt tightness and stretching in cervical spine  Posture: pt sits with rounded shoulders/ forward head position    General Comments     Cervical/Thoracic Comments  Vestibular Examination:    Primary Complaints: dizziness when bending down to  childrens' toys, getting out of the shower, when doing a lot of activity/ helping her kids get ready for school, facial tingling, tinnitus in ears, popping in ears, decreased short term memory   Pt finds she needs to write things down to remember; L eye pain    Exacerbating Factors: motion sickness when riding in the car, nausea, looking up gives HA    Relieving Factors: sitting, resting    I E: Modified CT SIB testing:  EO on firm surface:  2 89  sway index  EC on firm surface: 4 89  sway index  EO on foam surface:  2 34 sway index  EC on form surface:  4 05 sway index    7/5/18: Modified CT SIB testing  EO on firm surface: 1 98 sway index  EC on firm surface: 2 47 sway index  EO on foam surface: 1 75 sway index  EC on foam surface: 2 64 sway index    Spontaneous nystagmus room light: normal  Gaze holding nystagmus room light: normal  Skew deviation room light: negative  Smooth pursuits:  vertical:   30 seconds;    horizontal: 30 seconds; needed to think about the task  Horizontal head thrust test: (-) caused dizziness  VOR cancellation: 5 seconds  Near point convergence: 8 cm with headache ( normal 4- 6 cm)  Oculomotor mobility: fair  Sharp Adryan test: negative  Vertebral Artery Test: negative  [place] Hallpike test: negative  Horizontal Roll test: negative          Dx: post concussion syndrome   EPOC: 07/24/18  CO-MORBIDITIES: none  PERSONAL FACTORS: special , HA, dizziness,  Precautions: R UE radiculopathy; consider mechanical traction    Daily Treatment Diary   6/19  Manual  7/5      6/21 6/26 6/28 7/3   Cervical distraction th      JK  Jk JK   B UT, levator scap, rhomboid MFR       JK  JK JK   Manual stretching c spine        th                   13'      20'  15' 15'       Exercise Diary  7/5      6/21 6/26 6/28 7/3                BW UBE 4'      3' 3' 4' 4'   TB rows GTB 10x2      OTB  20 OTB 20  otb  20 OTB  20   TB LPD GTB 2  x10      OTB  20 OTB x20 OTB  20x OTb  20x   Supine chin tuck             Sidestepping on foam       3 laps 3 laps 3 laps 3 laps   Tandem walking       3 laps 3 laps 3 laps 3 laps   Rhomberg EO foam         30"x2 30"x2   Rhomberg EC on level surface         30"x1  30"x1  foam 30"x2  foam                VOR x1 horiz          30"x2 30"x2   VOR x1 vert         30"x2 30"x2   Imaginary targets         30"x2 30"x2   Targets: with and without stopping                                                                                               Modalities  7/5            CP  supine             mech TX cerv 5' x2

## 2018-07-10 ENCOUNTER — OFFICE VISIT (OUTPATIENT)
Dept: PHYSICAL THERAPY | Facility: CLINIC | Age: 38
End: 2018-07-10
Payer: COMMERCIAL

## 2018-07-10 DIAGNOSIS — R42 DIZZINESS: ICD-10-CM

## 2018-07-10 DIAGNOSIS — F07.81 POST CONCUSSION SYNDROME: Primary | ICD-10-CM

## 2018-07-10 PROCEDURE — 97112 NEUROMUSCULAR REEDUCATION: CPT

## 2018-07-10 PROCEDURE — 97110 THERAPEUTIC EXERCISES: CPT

## 2018-07-10 PROCEDURE — 97012 MECHANICAL TRACTION THERAPY: CPT

## 2018-07-10 PROCEDURE — 97140 MANUAL THERAPY 1/> REGIONS: CPT

## 2018-07-10 NOTE — PROGRESS NOTES
Daily Note     Today's date: 7/10/2018  Patient name: Alessandro Coleman  : 1980  MRN: [id number]  Referring provider: Suha Mccauley  Dx:   Encounter Diagnosis     ICD-10-CM    1  Post concussion syndrome F07 81    2  Dizziness R42                   Subjective: Reports the HA's and the pain behind her eyes has "toned down"  States still getting the pain behind the eye, but not as often  Still haivng tingling in her face  Pt reports working on the computer this weekend for 20 mins and did not get a HA  Pt states increasing her func activities this weekend w/o any ill effects  Objective: See treatment diary below      Assessment: Tolerated treatment well  Patient able to justin VOR ex's while sitting on the pball  Pt needs work on her LB posture to improve her cspine posture  Plan: Continue per plan of care  Progress treatment as tolerated      Dx: post concussion syndrome   EPOC: 18  CO-MORBIDITIES: none  PERSONAL FACTORS: special , HA, dizziness,  Precautions: R UE radiculopathy; consider mechanical traction    Daily Treatment Diary     Manual  7/5 7/10     6/21 6/26 6/28 7/3   Cervical distraction th Jk     JK th Jk JK   B UT, levator scap, rhomboid MFR th JK     JK th JK JK   Manual stretching c spine        th                   13' 15'     20'  15' 15'       Exercise Diary  7/5 7/10     6/21 6/26 6/28 7/3                BW UBE 4' 4'     3' 3' 4' 4'   TB rows GTB 10x2 GTB  10x2     OTB  20 OTB 20  otb  20 OTB  20   TB LPD GTB 2  x10 GTB  10x2     OTB  20 OTB x20 OTB  20x OTb  20x   Supine chin tuck             Sidestepping on foam  3 laps     3 laps 3 laps 3 laps 3 laps   Tandem walking  3 laps     3 laps 3 laps 3 laps 3 laps   Rhomberg EO foam  30'x2         30"x2 30"x2   Rhomberg EC on level surface  30"x2       30"x1  30"x1  foam 30"x2  foam                VOR x1 horz  30"x2  pbll       30"x2 30"x2   VOR x1 vert  30"x2  pball       30"x2 30"x2   Imaginary targets 30"x2  pball  sit         30"x2 30"x2   Targets: with and without stopping                                                                                               Modalities  7/5            CP  supine             mech TX cerv 5' x2

## 2018-07-12 ENCOUNTER — OFFICE VISIT (OUTPATIENT)
Dept: PHYSICAL THERAPY | Facility: CLINIC | Age: 38
End: 2018-07-12
Payer: COMMERCIAL

## 2018-07-12 DIAGNOSIS — F07.81 POST CONCUSSION SYNDROME: Primary | ICD-10-CM

## 2018-07-12 DIAGNOSIS — R42 DIZZINESS: ICD-10-CM

## 2018-07-12 PROCEDURE — 97110 THERAPEUTIC EXERCISES: CPT | Performed by: PHYSICAL THERAPIST

## 2018-07-12 PROCEDURE — 97140 MANUAL THERAPY 1/> REGIONS: CPT | Performed by: PHYSICAL THERAPIST

## 2018-07-12 PROCEDURE — 97112 NEUROMUSCULAR REEDUCATION: CPT | Performed by: PHYSICAL THERAPIST

## 2018-07-12 NOTE — PROGRESS NOTES
Daily Note     Today's date: 2018  Patient name: Berna Tapia  : 1980  MRN: [id number]  Referring provider: Oracio Patel  Dx:   Encounter Diagnosis     ICD-10-CM    1  Post concussion syndrome F07 81    2  Dizziness R42                   Subjective: Pt was able to drive to [facility] without eye pain  Pt is able to read for longer time periods for about 15minutes  Pt has motion sickness when driving 3/1/73  Pt continues to have difficulty with sleeping due to HA in the back of her head  Pt notes her sleep habits are still " off  Pt notes increased anxiety  Pt is able to look at a computer screen for 15-20 minutes      Objective: See treatment diary below      Assessment: Tolerated treatment well  Patient demonstrated fatigue post treatment  Pt was challenged by more advanced oculomotor ex  Pt will be going for trigger point injections with Dr Ingrid Sandoval today  Plan: Continue per plan of care       Dx: post concussion syndrome   EPOC: 18  CO-MORBIDITIES: none  PERSONAL FACTORS: special , HA, dizziness,  Precautions: R UE radiculopathy; consider mechanical traction    Daily Treatment Diary     Manual  7/5 7/10 7/12    6/21 6/26 6/28 7/3   Cervical distraction th Jk th    JK th Jk JK   B UT, levator scap, rhomboid MFR th JK th    JK th JK JK   Manual stretching c spine   Th      th     c spine lat glides   th           15' 15' 25'    20'  15' 15'       Exercise Diary  7/5 7/10 7/12    6/21 6/26 6/28 73                BW UBE 4' 4' 4'    3' 3' 4' 4'   TB rows GTB 10x2 GTB  10x2 GTB 10 x2    OTB  20 OTB 20  otb  20 OTB  20   TB LPD GTB 2  x10 GTB  10x2 GTB 10 x2    OTB  20 OTB x20 OTB  20x OTb  20x   Supine chin tuck   pball 10          Sidestepping on foam  3 laps     3 laps 3 laps 3 laps 3 laps   Tandem walking  3 laps     3 laps 3 laps 3 laps 3 laps   Rhomberg EO foam  30'x2         30"x2 30"x2   Rhomberg EC on level surface  30"x2       30"x1  30"x1  foam 30"x2  foam   VOR x2 horiz 20" x2 pball          VOR x1 horz  30"x2  pbll       30"x2 30"x2   VOR x1 vert  30"x2  pball       30"x2 30"x2   Imaginary targets  30"x2  pball  sit         30"x2 30"x2   Targets: with and without stopping             Walking w/ head turns   2 laps                                                                               Modalities  7/5 7/12           CP  supine             mech TX cerv 5' x2

## 2018-07-17 ENCOUNTER — OFFICE VISIT (OUTPATIENT)
Dept: PHYSICAL THERAPY | Facility: CLINIC | Age: 38
End: 2018-07-17
Payer: COMMERCIAL

## 2018-07-17 DIAGNOSIS — F07.81 POST CONCUSSION SYNDROME: ICD-10-CM

## 2018-07-17 DIAGNOSIS — R42 DIZZINESS: Primary | ICD-10-CM

## 2018-07-17 PROCEDURE — 97112 NEUROMUSCULAR REEDUCATION: CPT

## 2018-07-17 PROCEDURE — 97110 THERAPEUTIC EXERCISES: CPT

## 2018-07-17 PROCEDURE — 97140 MANUAL THERAPY 1/> REGIONS: CPT

## 2018-07-17 PROCEDURE — G8978 MOBILITY CURRENT STATUS: HCPCS

## 2018-07-17 PROCEDURE — G8979 MOBILITY GOAL STATUS: HCPCS

## 2018-07-17 NOTE — PROGRESS NOTES
Daily Note     Today's date: 2018  Patient name: Ibis Barber  : 1980  MRN: [id number]  Referring provider: Marija Gamboa  Dx: No diagnosis found  Subjective: Pt states she has a HA behind her L eye today which it's normally in the R   Pt states able to increase household activities  Objective: See treatment diary below  Pt deferred mech tx due to HA  Assessment: Tolerated treatment well  Patient demonstrated fatigue post treatment and would benefit from continued PT      Plan: Continue per plan of care  Progress treatment as tolerated      Dx: post concussion syndrome   EPOC: 18  CO-MORBIDITIES: none  PERSONAL FACTORS: special , HA, dizziness,  Precautions: R UE radiculopathy; consider mechanical traction    Daily Treatment Diary     Manual  7/5 7/10 7/12    6/21 6/26 6/28 7/3   Cervical distraction th Jk th    JK th Jk JK   B UT, levator scap, rhomboid MFR th JK th    JK th JK JK   Manual stretching c spine   Th      th     c spine lat glides   th           15' 15' 25'    20'  15' 15'       Exercise Diary  7/5 7/10 7/12 7/17   6/21 6/26 6/28 7/3                BW UBE 4' 4' 4' 4'   3' 3' 4' 4'   TB rows GTB 10x2 GTB  10x2 GTB 10 x2 GTB  20   OTB  20 OTB 20  otb  20 OTB  20   TB LPD GTB 2  x10 GTB  10x2 GTB 10 x2 10x2   OTB  20 OTB x20 OTB  20x OTb  20x   Supine chin tuck   pball 10 GTB  10x2         Sidestepping on foam  3 laps  3 laps   3 laps 3 laps 3 laps 3 laps   Tandem walking  3 laps  3 laps   3 laps 3 laps 3 laps 3 laps   Rhomberg EO foam  30'x2         30"x2 30"x2   Rhomberg EC on level surface  30"x2  30"x2  foam     30"x1  30"x1  foam 30"x2  foam   VOR x2 horiz   20" x2 pball          VOR x1 horz  30"x2  pbll  30"x2  pball     30"x2 30"x2   VOR x1 vert  30"x2  pball  30"x2  pball     30"x2 30"x2   Imaginary targets  30"x2  pball  sit    30"x2  pball  sit     30"x2 30"x2   Targets: with and without stopping             Walking w/ head turns   2 laps nv                                                                              Modalities  7/5 7/12 7/17          CP  supine             mech TX cerv 5' x2  defer

## 2018-07-20 ENCOUNTER — OFFICE VISIT (OUTPATIENT)
Dept: PHYSICAL THERAPY | Facility: CLINIC | Age: 38
End: 2018-07-20
Payer: COMMERCIAL

## 2018-07-20 DIAGNOSIS — R42 DIZZINESS: Primary | ICD-10-CM

## 2018-07-20 DIAGNOSIS — F07.81 POST CONCUSSION SYNDROME: ICD-10-CM

## 2018-07-20 PROCEDURE — 97112 NEUROMUSCULAR REEDUCATION: CPT | Performed by: PHYSICAL THERAPIST

## 2018-07-20 PROCEDURE — 97012 MECHANICAL TRACTION THERAPY: CPT | Performed by: PHYSICAL THERAPIST

## 2018-07-20 PROCEDURE — 97140 MANUAL THERAPY 1/> REGIONS: CPT | Performed by: PHYSICAL THERAPIST

## 2018-07-20 NOTE — PROGRESS NOTES
Daily Note     Today's date: 2018  Patient name: Alessandro Coleman  : 1980  MRN: [id number]  Referring provider: Suha Mccauley  Dx:   Encounter Diagnosis     ICD-10-CM    1  Dizziness R42    2  Post concussion syndrome F07 81                   Subjective: Pt woke up with R eye pain, but it went away  Pt had a HA for 4 days, but it resolved finally  Pt was able to drive from [facility] to [place], for one hour without aggravation of sx      Objective: See treatment diary below      Assessment: Tolerated treatment well  Patient tolerated higher level oculomotor ex      Plan: Continue per plan of care       Dx: post concussion syndrome   EPOC: 18  CO-MORBIDITIES: none  PERSONAL FACTORS: special , HA, dizziness,  Precautions: R UE radiculopathy; consider mechanical traction    Daily Treatment Diary     Manual  7/5 7/10 7/12 7/20   6/21 6/26 6/28 7/3   Cervical distraction th Jk th th   JK th Jk JK   B UT, levator scap, rhomboid MFR th JK th th   JK th JK JK   Manual stretching c spine   Th  th    th     c spine lat glides   th th          13' 15' 25' 10'   20'  15' 15'       Exercise Diary  7/5 7/10 7/12 7/17 7/20  6/21 6/26 6/28 7/3                BW UBE 4' 4' 4' 4' 4'  3' 3' 4' 4'   TB rows GTB 10x2 GTB  10x2 GTB 10 x2 GTB  20 B TB x15  OTB  20 OTB 20  otb  20 OTB  20   TB LPD GTB 2  x10 GTB  10x2 GTB 10 x2 10x2 BTB  x15  OTB  20 OTB x20 OTB  20x OTb  20x   Supine chin tuck   pball 10 GTB  10x2         Sidestepping on foam  3 laps  3 laps 3 laps  3 laps 3 laps 3 laps 3 laps   Tandem walking  3 laps  3 laps 3 laps on foam  3 laps 3 laps 3 laps 3 laps   Rhomberg EO foam  30'x2         30"x2 30"x2   Rhomberg EC on level surface  30"x2  30"x2  foam     30"x1  30"x1  foam 30"x2  foam   VOR x2 horiz   20" x2 pball          VOR x1 horz  30"x2  pbll  30"x2  pball On rockerboard 30"x2    30"x2 30"x2   VOR x1 vert  30"x2  pball  30"x2  pball RB 30"x2    30"x2 30"x2   Imaginary targets 30"x2  pball  sit    30"x2  pball  sit 30"x2    30"x2 30"x2   Targets: with and without stopping             Walking w/ head turns   2 laps nv 3 laps ML/ AP                                                                             Modalities  7/5 7/12 7/17 7/20         CP  supine             mech TX cerv 5' x2  defer 5' x2

## 2018-07-24 ENCOUNTER — OFFICE VISIT (OUTPATIENT)
Dept: PHYSICAL THERAPY | Facility: CLINIC | Age: 38
End: 2018-07-24
Payer: COMMERCIAL

## 2018-07-24 DIAGNOSIS — R42 DIZZINESS: Primary | ICD-10-CM

## 2018-07-24 DIAGNOSIS — F07.81 POST CONCUSSION SYNDROME: ICD-10-CM

## 2018-07-24 PROCEDURE — 97110 THERAPEUTIC EXERCISES: CPT

## 2018-07-24 PROCEDURE — 97112 NEUROMUSCULAR REEDUCATION: CPT

## 2018-07-24 PROCEDURE — 97140 MANUAL THERAPY 1/> REGIONS: CPT

## 2018-07-24 PROCEDURE — 97012 MECHANICAL TRACTION THERAPY: CPT

## 2018-07-24 NOTE — PROGRESS NOTES
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  Reports she is not sleeping well due to her neck is not comfortable  Pt c/o face tingling today  Objective: See treatment diary below  Concentrated on core DLS e'x due to pt's poor posture t/o the spine influencing her cspine  Assessment: Tolerated treatment fair  Patient would benefit from continued PT      Plan: Continue per plan of care  Progress treatment as tolerated      Dx: post concussion syndrome   EPOC: 18  CO-MORBIDITIES: none  PERSONAL FACTORS: special , HA, dizziness,  Precautions: R UE radiculopathy; consider mechanical traction    Daily Treatment Diary     Manual  7/5 7/10 7/12 7/20 7/24  6/21 6/26 6/28 7/3   Cervical distraction th Jk th th JK  JK th Jk JK   B UT, levator scap, rhomboid MFR th JK th th JK  JK th JK JK   Manual stretching c spine   Th  th    th     c spine lat glides   th th          15' 15' 25' 10' 10'  20'  15' 15'       Exercise Diary  7/5 7/10 7/12 7/17 7/20 7/24                    BW UBE 4' 4' 4' 4' 4' 4'       TB rows GTB 10x2 GTB  10x2 GTB 10 x2 GTB  20 B TB x15 BTB  20x       TB LPD GTB 2  x10 GTB  10x2 GTB 10 x2 10x2 BTB  x15 20x  BTB       Supine chin tuck   pball 10 GTB  10x2         Sidestepping on foam  3 laps  3 laps 3 laps 3 laps       Tandem walking  3 laps  3 laps 3 laps on foam 3 laps  On foam       Rhomberg EO foam  30'x2             Rhomberg EC on level surface  30"x2  30"x2  foam         VOR x2 horiz   20" x2 pball          VOR x1 horz  30"x2  pbll  30"x2  pball On rockerboard 30"x2 30"x2  rocker bd         VOR x1 vert  30"x2  pball  30"x2  pball RB 30"x2 RB  30"x2       Imaginary targets  30"x2  pball  sit    30"x2  pball  sit 30"x2 np       Targets: with and without stopping             Walking w/ head turns   2 laps nv 3 laps ML/ AP NV       DLS hip abd/add      10x10"  GTB       DLS: shld flex OH w/ KB      x5   5#KB       Prone squeeze and HOLD      10x10"       Prone ext      10X       PRONE ROWS      10X           Modalities  7/5 7/12 7/17 7/20 7/24        CP  supine             mec TX cerv 5' x2  defer 5' x2 5'X2

## 2018-07-26 ENCOUNTER — APPOINTMENT (OUTPATIENT)
Dept: PHYSICAL THERAPY | Facility: CLINIC | Age: 38
End: 2018-07-26
Payer: COMMERCIAL

## 2018-07-27 ENCOUNTER — OFFICE VISIT (OUTPATIENT)
Dept: PHYSICAL THERAPY | Facility: CLINIC | Age: 38
End: 2018-07-27
Payer: COMMERCIAL

## 2018-07-27 DIAGNOSIS — R42 DIZZINESS: Primary | ICD-10-CM

## 2018-07-27 DIAGNOSIS — F07.81 POST CONCUSSION SYNDROME: ICD-10-CM

## 2018-07-27 PROCEDURE — 97140 MANUAL THERAPY 1/> REGIONS: CPT

## 2018-07-27 PROCEDURE — 97110 THERAPEUTIC EXERCISES: CPT

## 2018-07-27 PROCEDURE — 97112 NEUROMUSCULAR REEDUCATION: CPT

## 2018-07-27 NOTE — PROGRESS NOTES
Daily Note     Today's date: 2018  Patient name: Geovanna Coleman  : 1980  MRN: [id number]  Referring provider: Jossy Bates  Dx:   Encounter Diagnosis     ICD-10-CM    1  Dizziness R42    2  Post concussion syndrome F07 81                   Subjective: Pt reports she had injections in the neck and head for the tingling and pain  Pt reports neurologist ordered an MRI  Objective: See treatment diary below      Assessment: Tolerated treatment well  Patient better able to follow direction and took initiative to start the next ex  Plan: Continue per plan of care  Progress treatment as tolerated      Dx: post concussion syndrome   EPOC: 18  CO-MORBIDITIES: none  PERSONAL FACTORS: special , HA, dizziness,  Precautions: R UE radiculopathy; consider mechanical traction    Daily Treatment Diary     Manual  7/5 7/10 7/12 7/20 7/24 7/27 6/21 6/26 6/28 7/3   Cervical distraction th Jk th th JK JK JK th Jk JK   B UT, levator scap, rhomboid MFR th JK th th JK JK JK th JK JK   Manual stretching c spine   Th  th  JK  th     c spine lat glides   th th          13' 15' 25' 10' 10' 15' 20'  15' 15'       Exercise Diary  7/5 7/10 7/12 7/17 7/20 7/24 7/27                   BW UBE 4' 4' 4' 4' 4' 4' 4'      TB rows GTB 10x2 GTB  10x2 GTB 10 x2 GTB  20 B TB x15 BTB  20x BTB  20x      TB LPD GTB 2  x10 GTB  10x2 GTB 10 x2 10x2 BTB  x15 20x  BTB BTB  15x      Supine chin tuck   pball 10 GTB  10x2         Sidestepping on foam  3 laps  3 laps 3 laps 3 laps 4 laps      Tandem walking  3 laps  3 laps 3 laps on foam 3 laps  On foam 4 laps  foam      Rhomberg EO foam  30'x2             Rhomberg EC on level surface  30"x2  30"x2  foam         VOR x2 horiz   20" x2 pball          VOR x1 horz  30"x2  pbll  30"x2  pball On rockerboard 30"x2 30"x2  rocker bd   30"x2  RB      VOR x1 vert  30"x2  pball  30"x2  pball RB 30"x2 RB  30"x2 R30"x2B      Imaginary targets  30"x2  pball  sit    30"x2  pball  sit 30"x2 np Targets: with and without stopping             Walking w/ head turns   2 laps nv 3 laps ML/ AP NV       DLS hip abd/add      10x10"  GTB 10x10"  GTB      DLS: shld flex OH w/ KB      x5   5#KB 10x  5#KB      Prone squeeze and HOLD      10x10" 10x10"      Prone ext      10X 10x      PRONE ROWS      10X 10x          Modalities  7/5 7/12 7/17 7/20 7/24 7/27       CP  supine             mech TX cerv 5' x2  defer 5' x2 5'X2 Hold due to inject

## 2018-07-31 ENCOUNTER — OFFICE VISIT (OUTPATIENT)
Dept: PHYSICAL THERAPY | Facility: CLINIC | Age: 38
End: 2018-07-31
Payer: COMMERCIAL

## 2018-07-31 DIAGNOSIS — R42 DIZZINESS: Primary | ICD-10-CM

## 2018-07-31 DIAGNOSIS — F07.81 POST CONCUSSION SYNDROME: ICD-10-CM

## 2018-07-31 PROCEDURE — 97112 NEUROMUSCULAR REEDUCATION: CPT | Performed by: PHYSICAL THERAPIST

## 2018-07-31 PROCEDURE — 97140 MANUAL THERAPY 1/> REGIONS: CPT | Performed by: PHYSICAL THERAPIST

## 2018-07-31 NOTE — PROGRESS NOTES
Daily Note     Today's date: 2018  Patient name: Markham Ormond  : 1980  MRN: [id number]  Referring provider: Theophilus Sandhoff  Dx:   Encounter Diagnosis     ICD-10-CM    1  Dizziness R42    2  Post concussion syndrome F07 81                   Subjective: Pt is feeling good today  HA behind her eyes have decreased  Pt will be going for an MRI  Pt continues to have numbness on the side of her face, like she has been to the dentist  Pt's energy levels are increased  Pt is driving normal distances [place] [address] without difficulty  Pt no longer has UE radicular sx  Objective: See treatment diary below      Assessment: Tolerated treatment well  Patient exhibited good technique with therapeutic exercises      Plan: Continue per plan of care  Will hold on skilled PT 2* pt no longer as UE radicular sx      Dx: post concussion syndrome   EPOC: 18  CO-MORBIDITIES: none  PERSONAL FACTORS: special , HA, dizziness,  Precautions: R UE radiculopathy; consider mechanical traction    Daily Treatment Diary     Manual  7/5 7/10 7/12 7/20 7/24 7/27 7/31      Cervical distraction th Jk th th [place] th      B UT, levator scap, rhomboid MFR th JK th th JK JK th      Manual stretching c spine   Th  th  JK       c spine lat glides   th th          15' 15' 25' 10' 10' 15' 15'          Exercise Diary  7/5 7/10 7/12 7/17 7/20 7/24 7/27 7/31                  BW UBE 4' 4' 4' 4' 4' 4' 4' 4'     TB rows GTB 10x2 GTB  10x2 GTB 10 x2 GTB  20 B TB x15 BTB  20x BTB  20x      TB LPD GTB 2  x10 GTB  10x2 GTB 10 x2 10x2 BTB  x15 20x  BTB BTB  15x      Supine chin tuck   pball 10 GTB  10x2         Sidestepping on foam  3 laps  3 laps 3 laps 3 laps 4 laps      Tandem walking  3 laps  3 laps 3 laps on foam 3 laps  On foam 4 laps  foam      Rhomberg EO foam  30'x2        SLS B TF 30"x2 B     Rhomberg EC on level surface  30"x2  30"x2  foam         VOR x2 horiz   20" x2 pball     tand stance 30"x2     VOR x1 horz  30"x2  pbll  30"x2  pball On rockerboard 30"x2 30"x2  rocker bd   30"x2  RB      VOR x1 vert  30"x2  pball  30"x2  pball RB 30"x2 RB  30"x2 R30"x2B      Imaginary targets  30"x2  pball  sit    30"x2  pball  sit 30"x2 np       elliptical with head mvts        5'     Walking w/ head turns   2 laps nv 3 laps ML/ AP NV  3 laps ML/AP ea     DLS hip abd/add      10x10"  GTB 10x10"  GTB 10 x10" GTB     DLS: shld flex OH w/ KB      x5   5#KB 10x  5#KB 15 5# KB     Prone squeeze and HOLD      10x10" 10x10" 10 "x10     Prone ext      10X 10x 10     PRONE ROWS      10X 10x 10     Prone horiz abd        10         Modalities  7/5 7/12 7/17 7/20 7/24 7/27 7/31      CP  supine             mech TX cerv 5' x2  defer 5' x2 5'X2 Hold due to inject hold

## 2018-08-02 ENCOUNTER — APPOINTMENT (OUTPATIENT)
Dept: PHYSICAL THERAPY | Facility: CLINIC | Age: 38
End: 2018-08-02
Payer: COMMERCIAL

## 2018-08-03 ENCOUNTER — TRANSCRIBE ORDERS (OUTPATIENT)
Dept: ADMINISTRATIVE | Facility: HOSPITAL | Age: 38
End: 2018-08-03

## 2018-08-03 DIAGNOSIS — R20.0 TACTILE ANESTHESIA: Primary | ICD-10-CM

## 2018-08-03 DIAGNOSIS — R51.9 FACIAL PAIN: ICD-10-CM

## 2018-08-07 ENCOUNTER — HOSPITAL ENCOUNTER (OUTPATIENT)
Dept: MRI IMAGING | Facility: HOSPITAL | Age: 38
Discharge: HOME | End: 2018-08-07
Payer: COMMERCIAL

## 2018-08-07 DIAGNOSIS — R20.0 TACTILE ANESTHESIA: ICD-10-CM

## 2018-08-07 DIAGNOSIS — R51.9 FACIAL PAIN: ICD-10-CM

## 2018-08-07 PROCEDURE — 70553 MRI BRAIN STEM W/O & W/DYE: CPT

## 2018-08-07 PROCEDURE — A9585 GADOBUTROL INJECTION: HCPCS | Performed by: RADIOLOGY

## 2018-08-07 RX ADMIN — GADOBUTROL 6 ML: 604.72 INJECTION INTRAVENOUS at 11:28

## 2018-08-10 ENCOUNTER — EVALUATION (OUTPATIENT)
Dept: PHYSICAL THERAPY | Facility: CLINIC | Age: 38
End: 2018-08-10
Payer: COMMERCIAL

## 2018-08-10 ENCOUNTER — TRANSCRIBE ORDERS (OUTPATIENT)
Dept: PHYSICAL THERAPY | Facility: CLINIC | Age: 38
End: 2018-08-10

## 2018-08-10 DIAGNOSIS — R42 DIZZINESS AND GIDDINESS: Primary | ICD-10-CM

## 2018-08-10 DIAGNOSIS — F07.81 POST CONCUSSION SYNDROME: ICD-10-CM

## 2018-08-10 DIAGNOSIS — R42 DIZZINESS: Primary | ICD-10-CM

## 2018-08-10 PROCEDURE — 97164 PT RE-EVAL EST PLAN CARE: CPT | Performed by: PHYSICAL THERAPIST

## 2018-08-10 PROCEDURE — G8979 MOBILITY GOAL STATUS: HCPCS | Performed by: PHYSICAL THERAPIST

## 2018-08-10 PROCEDURE — G8980 MOBILITY D/C STATUS: HCPCS | Performed by: PHYSICAL THERAPIST

## 2018-08-10 NOTE — LETTER
2018    [facility]  [address] [facility] [address]    Patient: Marialuisa Ingram   YOB: 1980   Date of Visit: 8/10/2018     Encounter Diagnosis     ICD-10-CM    1  Dizziness R42    2  Post concussion syndrome F07 81        Dear Dr Osvaldo Amezcua:    Please review the attached Plan of Care from [facility] recent visit  Please verify that you agree therapy should continue by signing the attached document and sending it back to our office  If you have any questions or concerns, please don't hesitate to call  Sincerely,    Cricket Guerra, PT      Referring Provider:      I certify that I have read the below Plan of Care and certify the need for these services furnished under this plan of treatment while under my care  Estefany Lang  [address] [facility] [address]  VIA Facsimile: [phone]          PT Discharge    Today's date: 8/10/2018  Patient name: Marialuisa Ingram  : 1980  MRN: [id number]  Referring provider: Davonte Blackburn  Dx:   Encounter Diagnosis     ICD-10-CM    1  Dizziness R42    2  Post concussion syndrome F07 81                   Assessment  Impairments: impaired balance  Other impairment: oculomotor and convergence deficits    Assessment details: Since starting skilled PT, neck pain and HA are decreased, 4 out of 4 balance conditions improved, oculomotor deficits improved with good functional progress  Recommend pt be discharged from skilled PT at this time  Understanding of Dx/Px/POC: good   Prognosis: good    Goals  STG's ( 3-4 weeks)  1  Pt will be independent in HEP- met  2  Improve convergence ability to 6-8 cm- met  LTG's ( 6- 8 weeks)  1  Improve FOTO score by 10-12 points-met  2  Decrease neck pain to 1-2/10-  met  3  Improve balance demonstrated by decreased CT- SIB testing- met  4  Pt will have improved function on oculomotor testing with less sx- met    Plan  Frequency: D/c to HEP    Treatment plan discussed with: patient        Subjective Evaluation    History of Present Illness  Mechanism of injury: Pt reports her activity levels are increasing, and feels tired at the end of the day and end of the week  Pt is able to read for 15- 20 minutes per night when reading books to her children  Pt is incrasing technology use and is able to use the computer for 30 minutes  Pt has less light and sound sensitivity  Pt is aggravated by sharp high pitched noises  Pt is able to drive or about 60 minutes ( [place] to [place])   Pt no longer has neck pain or HA, but continues to get sharp " ice cream" pain behind her R eye  Pt not longer has tiinnitus sx and R UE radicular sx   Pt is very concerned about tingling in face  Pt has less dizziness when standing up after bending forward in her kitchen cabinets  Pt was able to take her kids to a local carnival  Pt not longer has "heavy headedness"    Work: ; currently not working  Hobbies: exercising at [employer], running, camping, outside activities, gardening  Gait: no abnormalities  Pain  At best pain ratin  At worst pain ratin  Location: R posterior eye pain  Quality: sharp    Social Support  Steps to enter house: yes  3  Stairs in house: yes   10  Lives in: multiple-level home  Lives with: spouse    Treatments  Current treatment: physical therapy  Patient Goals  Patient goal: to regain her balance, decrease her " heavy headedness", decrease fatigue        Objective     Neurological Testing     Sensation   Cervical/Thoracic   Left   Intact: light touch    Right   Intact: light touch    Reflexes   Left   Biceps (C5/C6): normal (2+)  Brachioradialis (C6): normal (2+)  Triceps (C7): normal (2+)    Right   Biceps (C5/C6): normal (2+)  Brachioradialis (C6): normal (2+)  Triceps (C7): normal (2+)    Active Range of Motion   Cervical/Thoracic Spine   Cervical    Flexion: 45 degrees   Extension: 74 degrees   Left lateral flexion: 30 degrees   Right lateral flexion: 29 degrees   Left rotation: WFL  Right rotation: [facility]    Additional Active Range of Motion Details  Pt tightness and stretching in cervical spine  Posture: pt sits with rounded shoulders/ forward head position    General Comments     Cervical/Thoracic Comments  Vestibular Examination:    Primary Complaints: R eye pain, facial tingling    Exacerbating Factors: unknown; concentrating to read or do computer work    Relieving Factors: sitting, resting    I E: Modified CT SIB testing:  EO on firm surface:  2 89  sway index  EC on firm surface: 4 89  sway index  EO on foam surface:  2 34 sway index  EC on form surface:  4 05 sway index    7/5/18: Modified CT SIB testing  EO on firm surface: 1 98 sway index  EC on firm surface: 2 47 sway index  EO on foam surface: 1 75 sway index  EC on foam surface: 2 64 sway index     8/10/18: Modified CT -SIB testing:   EO on firm surface:  73 sway index- improved  EC on firm surface: 1 17 sway index- improved  EO on foam surface:  73 sway index -improved  EC on foam surface: 2 52 sway index - improved    Spontaneous nystagmus room light: normal  Gaze holding nystagmus room light: normal  Skew deviation room light: negative  Smooth pursuits:  vertical:   30 seconds;    horizontal: 30 seconds; normal  Horizontal head thrust test: (-)   VOR cancellation: 30 seconds; mild forehead discomfort  Near point convergence: normal ( normal 4- 6 cm)  Oculomotor mobility: good  Sharp Adryan test: negative  Vertebral Artery Test: negative  [place] Hallpike test: negative  Horizontal Roll test: negative          Dx: post concussion syndrome   EPOC: 07/24/18  CO-MORBIDITIES: none  PERSONAL FACTORS: special , HA, dizziness,  Precautions: R UE radiculopathy; consider mechanical traction    Daily Treatment Diary   6/19  Manual  7/5      6/21 6/26 6/28 7/3   Cervical distraction th      JK th Jk JK   B UT, levator scap, rhomboid MFR th      JK th JK JK   Manual stretching c spine        th 15'      20'  15' 15'       Exercise Diary  7/5 8/10     6/21 6/26 6/28 7/3                BW UBE 4' 4'     3' 3' 4' 4'   TB rows GTB 10x2      OTB  20 OTB 20  otb  20 OTB  20   TB LPD GTB 2  x10      OTB  20 OTB x20 OTB  20x OTb  20x   Supine chin tuck             Sidestepping on foam       3 laps 3 laps 3 laps 3 laps   Tandem walking       3 laps 3 laps 3 laps 3 laps   Rhomberg EO foam         30"x2 30"x2   Rhomberg EC on level surface         30"x1  30"x1  foam 30"x2  foam                VOR x1 horiz          30"x2 30"x2   VOR x1 vert         30"x2 30"x2   Imaginary targets         30"x2 30"x2   Targets: with and without stopping                                                                                               Modalities  7/5            CP  supine             mech TX cerv 5' x2

## 2018-08-10 NOTE — PROGRESS NOTES
PT Discharge    Today's date: 8/10/2018  Patient name: Rebeca Newman  : 1980  MRN: [id number]  Referring provider: Ching Callejas  Dx:   Encounter Diagnosis     ICD-10-CM    1  Dizziness R42    2  Post concussion syndrome F07 81                   Assessment  Impairments: impaired balance  Other impairment: oculomotor and convergence deficits    Assessment details: Since starting skilled PT, neck pain and HA are decreased, 4 out of 4 balance conditions improved, oculomotor deficits improved with good functional progress  Recommend pt be discharged from skilled PT at this time  Understanding of Dx/Px/POC: good   Prognosis: good    Goals  STG's ( 3-4 weeks)  1  Pt will be independent in HEP- met  2  Improve convergence ability to 6-8 cm- met  LTG's ( 6- 8 weeks)  1  Improve FOTO score by 10-12 points-met  2  Decrease neck pain to 1-2/10-  met  3  Improve balance demonstrated by decreased CT- SIB testing- met  4  Pt will have improved function on oculomotor testing with less sx- met    Plan  Frequency: D/c to HEP  Treatment plan discussed with: patient        Subjective Evaluation    History of Present Illness  Mechanism of injury: Pt reports her activity levels are increasing, and feels tired at the end of the day and end of the week  Pt is able to read for 15- 20 minutes per night when reading books to her children  Pt is incrasing technology use and is able to use the computer for 30 minutes  Pt has less light and sound sensitivity  Pt is aggravated by sharp high pitched noises  Pt is able to drive or about 60 minutes ( [place] to [place])   Pt no longer has neck pain or HA, but continues to get sharp " ice cream" pain behind her R eye  Pt not longer has tiinnitus sx and R UE radicular sx   Pt is very concerned about tingling in face  Pt has less dizziness when standing up after bending forward in her kitchen cabinets   Pt was able to take her kids to a local carnival  Pt not longer has "heavy headedness"    Work: ; currently not working  Hobbies: exercising at [employer], running, camping, outside activities, gardening  Gait: no abnormalities  Pain  At best pain ratin  At worst pain ratin  Location: R posterior eye pain  Quality: sharp    Social Support  Steps to enter house: yes  3  Stairs in house: yes   10  Lives in: multiple-level home  Lives with: spouse    Treatments  Current treatment: physical therapy  Patient Goals  Patient goal: to regain her balance, decrease her " heavy headedness", decrease fatigue        Objective     Neurological Testing     Sensation   Cervical/Thoracic   Left   Intact: light touch    Right   Intact: light touch    Reflexes   Left   Biceps (C5/C6): normal (2+)  Brachioradialis (C6): normal (2+)  Triceps (C7): normal (2+)    Right   Biceps (C5/C6): normal (2+)  Brachioradialis (C6): normal (2+)  Triceps (C7): normal (2+)    Active Range of Motion   Cervical/Thoracic Spine   Cervical    Flexion: 45 degrees   Extension: 74 degrees   Left lateral flexion: 30 degrees   Right lateral flexion: 29 degrees   Left rotation: WFL  Right rotation: [facility]    Additional Active Range of Motion Details  Pt tightness and stretching in cervical spine  Posture: pt sits with rounded shoulders/ forward head position    General Comments     Cervical/Thoracic Comments  Vestibular Examination:    Primary Complaints: R eye pain, facial tingling    Exacerbating Factors: unknown; concentrating to read or do computer work    Relieving Factors: sitting, resting    I E: Modified CT SIB testing:  EO on firm surface:  2 89  sway index  EC on firm surface: 4 89  sway index  EO on foam surface:  2 34 sway index  EC on form surface:  4 05 sway index    18: Modified CT SIB testing  EO on firm surface: 1 98 sway index  EC on firm surface: 2 47 sway index  EO on foam surface: 1 75 sway index  EC on foam surface: 2 64 sway index     8/10/18: Modified CT -SIB testing:   EO on firm surface:  73 sway index- improved  EC on firm surface: 1 17 sway index- improved  EO on foam surface:  73 sway index -improved  EC on foam surface: 2 52 sway index - improved    Spontaneous nystagmus room light: normal  Gaze holding nystagmus room light: normal  Skew deviation room light: negative  Smooth pursuits:  vertical:   30 seconds;    horizontal: 30 seconds; normal  Horizontal head thrust test: (-)   VOR cancellation: 30 seconds; mild forehead discomfort  Near point convergence: normal ( normal 4- 6 cm)  Oculomotor mobility: good  Sharp Adryan test: negative  Vertebral Artery Test: negative  [place] Hallpike test: negative  Horizontal Roll test: negative          Dx: post concussion syndrome   EPOC: 07/24/18  CO-MORBIDITIES: none  PERSONAL FACTORS: special , HA, dizziness,  Precautions: R UE radiculopathy; consider mechanical traction    Daily Treatment Diary   6/19  Manual  7/5      6/21 6/26 6/28 7/3   Cervical distraction th      JK th Jk JK   B UT, levator scap, rhomboid MFR th      JK th JK JK   Manual stretching c spine        th                   13'      20'  15' 15'       Exercise Diary  7/5 8/10     6/21 6/26 6/28 7/3                BW UBE 4' 4'     3' 3' 4' 4'   TB rows GTB 10x2      OTB  20 OTB 20  otb  20 OTB  20   TB LPD GTB 2  x10      OTB  20 OTB x20 OTB  20x OTb  20x   Supine chin tuck             Sidestepping on foam       3 laps 3 laps 3 laps 3 laps   Tandem walking       3 laps 3 laps 3 laps 3 laps   Rhomberg EO foam         30"x2 30"x2   Rhomberg EC on level surface         30"x1  30"x1  foam 30"x2  foam                VOR x1 horiz          30"x2 30"x2   VOR x1 vert         30"x2 30"x2   Imaginary targets         30"x2 30"x2   Targets: with and without stopping                                                                                               Modalities  7/5            CP  supine             mech TX cerv 5' x2

## 2018-10-16 ENCOUNTER — TELEPHONE (OUTPATIENT)
Dept: NEUROLOGY | Facility: CLINIC | Age: 38
End: 2018-10-16

## 2018-10-29 RX ORDER — FLUTICASONE PROPIONATE 50 MCG
2 SPRAY, SUSPENSION (ML) NASAL AS NEEDED
Refills: 5 | COMMUNITY
Start: 2018-10-02 | End: 2022-08-09 | Stop reason: SDUPTHER

## 2018-10-29 RX ORDER — TOPIRAMATE 100 MG/1
1 CAPSULE, EXTENDED RELEASE ORAL DAILY
Refills: 1 | COMMUNITY
Start: 2018-08-24 | End: 2019-02-21 | Stop reason: ALTCHOICE

## 2018-10-29 NOTE — PROGRESS NOTES
Patient ID: Jessa Chapin is a 43 y o  female  Assessment/Plan:   Problem List Items Addressed This Visit        Cardiovascular and Mediastinum    Migraine without aura and without status migrainosus, not intractable    Relevant Medications    TROKENDI  MG CP24    rizatriptan (MAXALT) 10 MG tablet       Nervous and Auditory    Tension headache, chronic - Primary    Relevant Medications    TROKENDI  MG CP24    rizatriptan (MAXALT) 10 MG tablet       Other    Atypical facial pain            I told the patient that I would like for her to try tizanidine 2 milligrams at bedtime daily for 30 days to see if this helps improve the tension-type headaches and the right sided facial/temporal pain  If it does she is to continue taking tizanidine  If it does not we would consider changing that to venlafaxine  She has been on trigger candy for about a year and does feel it is helping however has multiple different side effects from this  Such as paresthesia is vision changes  It would be a good option to change to a different preventive medication   - Patient may take Maxalt or naproxen at the onset of her migraine headache   - May also use naproxen as needed for her tension-type headaches or right temporal pain but less than 3 times a week  - I encouraged patient to continue keeping a headache diary for better assessment as to how often she is truly getting all 3 of these different headaches  - No further workup needed at this time  Headache management instructions  - When patient has a moderate to severe headache, they should seek rest, initiate relaxation and apply cold compresses to the head  - Maintain regular sleep schedule  Adults need at least 7-8 hours of uninterrupted a night  - Limit over the counter medications such as Tylenol, Ibuprofen, Aleve, Excedrin  (No more than 3 times a week)  - Maintain headache diary  We discussed an ROHIT for a smart phone is "Migraine e Diary"     - Limit caffeine to 1-2 cups a day or less  - Avoid dietary trigger  (aged cheese, peanuts, MSG, aspartame and nitrates)  - Patient is to have regular frequent meals to prevent headache onset  - Please drink at least 64 ounces of water a day to help remain hydrated  Please call with any questions or concerns  Subjective:  HPI  We had the pleasure of evaluating Kurt Elmore in neurological consultation today  As you know,  she is a 43 y  o right handed female  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 and is here today for evaluation of her headaches  She is planning on going on a [employer] in 16 days  Any family history of aneurysms - No  Family history of migraine headaches -   No         What is your current pain level - moderate - 5  Headaches started at what age - two different headaches - 36 "migraine-like headaches" and 37 "ice cream headaches"  How often do the headaches occur - Migraine headache - once per month  ice cream headache -1-2 times per week  What time of the day do the headaches start - Migraine and ice cream headache - no specific time frame  How long do the headaches last - 1-2 hours  Are you ever headache free - Yes  Where are they located - Migraine - band-like over the forehead and back of the head, Ice cream headache - lateral border of the right eye  What is the intensity of pain - 5  Aura and how long does it last - blurry vision with migraine - last only a few minutes  Describe your usual headache -   Tension type headaches - band-like aching pain     Ice cream headache - sharp on the lateral border of the right eye radiating down to the mouth  Migraine headaches: temporal pain and throbbing  Associated symptoms:   - Sensitivity to bright lights and sound  - Problem with concentration  - Blurred vision  - Lacrimation  - Tinnitus, light-headed  - Right hand tingles - intermittently  - Prefer to be alone and in a dark room  Headache are worse if the patient: bending over, running or exercising  Headache trigger: none  Though reports that running dose make her feel more nauseous  Are you current pregnant or planning on getting pregnant? NO  What time of the year do headaches occur more frequently - none  Have you seen someone else for headaches or pain - Yes, Dr Beronica Hugo - neurology  Have you had trigger point injection performed and how often - Yes - just finished - went for 2 week - noticed less often migraines since trigger point injections  Have you had Botox injection performed and how often - No  Have you had epidural injections or transforaminal injections performed - No  What medications do you take or have you taken for your headaches? PREVENTIVE:  Topamax extended release  ABORTIVE:  Naproxen, Maxalt,    Have you used CBD or THC for your headaches and how often? No  How much caffeine to you consume per day? One cup of coffee per day  Non-Medical/Alternative Treatments used in the past for headaches: None    08/07/2018 MRI head with and without contrast:  FINDINGS:   BRAIN PARENCHYMA:  There is no discrete mass, mass effect or midline shift  No abnormal white matter signal identified  Brainstem and cerebellum demonstrate normal signal  There is no intracranial hemorrhage  There is no evidence of acute infarction and   diffusion imaging is unremarkable  Normal postcontrast imaging    BRAINSTEM:  Normal trigeminal nerves  normal signal and no enhancement  Rayetta Zack is normal   No evidence of vascular compression   No CP angle mass    VENTRICLES:  Normal    SELLA AND PITUITARY GLAND:  Normal    ORBITS:  Normal    PARANASAL SINUSES:  Trace sinus mucosal disease   VASCULATURE:  Evaluation of the major intracranial vasculature demonstrates appropriate flow voids    CALVARIUM AND SKULL BASE:  Normal    EXTRACRANIAL SOFT TISSUES:  Normal    IMPRESSION:  No acute disease    The following portions of the patient's history were reviewed and updated as appropriate: allergies, current medications, past family history, past medical history, past social history, past surgical history and problem list      Objective:  Blood pressure 100/60, height 5' 3" (1 6 m), weight 64 5 kg (142 lb 4 8 oz)  Physical Exam   Constitutional: She is oriented to person, place, and time  She appears well-developed and well-nourished  No distress  HENT:   Head: Normocephalic and atraumatic  Eyes: Pupils are equal, round, and reactive to light  Conjunctivae and EOM are normal  Right eye exhibits no discharge  Left eye exhibits no discharge  No nystagmus  Neck: Normal range of motion  Pulmonary/Chest: Effort normal  No respiratory distress  Abdominal: She exhibits no distension  Musculoskeletal: Normal range of motion  She exhibits no edema  Neurological: She is alert and oriented to person, place, and time  Reflex Scores:       Bicep reflexes are 2+ on the right side and 2+ on the left side  Brachioradialis reflexes are 2+ on the right side and 2+ on the left side  Patellar reflexes are 2+ on the right side and 2+ on the left side  Achilles reflexes are 2+ on the right side and 2+ on the left side  Skin: Skin is warm and dry  She is not diaphoretic  No erythema  Psychiatric: She has a normal mood and affect  Her behavior is normal        Neurological Exam  Mental Status  Awake and alert  Cranial Nerves  CN I: Sense of smell is normal   CN II: Tested with correction  Visual acuity is normal   CN III, IV, VI: Extraocular movements intact bilaterally  No nystagmus  Normal saccades  Normal smooth pursuit  Pupils equal round and reactive to light bilaterally  CN V: Facial sensation is normal   CN VII: Full and symmetric facial movement  CN VIII: Hearing is normal   CN IX, X: Palate elevates symmetrically  CN XI: Shoulder shrug strength is normal   CN XII: Tongue midline without atrophy or fasciculations  Motor  Normal muscle bulk throughout   No fasciculations present  Normal muscle tone  Reflexes                                           Right                      Left  Brachioradialis                    2+                         2+  Biceps                                 2+                         2+  Patellar                                2+                         2+  Achilles                                2+                         2+    Coordination  Right: Finger-to-nose normal  Rapid alternating movement normal   Left: Finger-to-nose normal     Gait  Casual gait is normal including stance, stride, and arm swing  Normal toe walking  Normal heel walking  Normal tandem gait  Romberg is absent  Normal pull test  Able to rise from chair without using arms  ROS:    Review of Systems   Constitutional: Positive for fatigue  HENT: Positive for tinnitus  Eyes: Negative  Blurred Vision    Respiratory: Negative  Cardiovascular: Negative  Gastrointestinal: Negative  Endocrine: Negative  Genitourinary: Negative  Musculoskeletal: Negative  Skin: Negative  Allergic/Immunologic: Negative  Neurological: Positive for light-headedness, numbness (Facial ) and headaches  Hematological: Negative  Psychiatric/Behavioral: Positive for sleep disturbance (Trouble staying asleep)

## 2018-11-01 ENCOUNTER — OFFICE VISIT (OUTPATIENT)
Dept: NEUROLOGY | Facility: CLINIC | Age: 38
End: 2018-11-01
Payer: COMMERCIAL

## 2018-11-01 VITALS
SYSTOLIC BLOOD PRESSURE: 100 MMHG | BODY MASS INDEX: 25.21 KG/M2 | HEIGHT: 63 IN | WEIGHT: 142.3 LBS | DIASTOLIC BLOOD PRESSURE: 60 MMHG

## 2018-11-01 DIAGNOSIS — G43.009 MIGRAINE WITHOUT AURA AND WITHOUT STATUS MIGRAINOSUS, NOT INTRACTABLE: ICD-10-CM

## 2018-11-01 DIAGNOSIS — G50.1 ATYPICAL FACIAL PAIN: ICD-10-CM

## 2018-11-01 DIAGNOSIS — G44.229 CHRONIC TENSION-TYPE HEADACHE, NOT INTRACTABLE: Primary | ICD-10-CM

## 2018-11-01 PROCEDURE — 99244 OFF/OP CNSLTJ NEW/EST MOD 40: CPT | Performed by: PSYCHIATRY & NEUROLOGY

## 2018-11-01 RX ORDER — TIZANIDINE HYDROCHLORIDE 2 MG/1
CAPSULE, GELATIN COATED ORAL
Qty: 30 CAPSULE | Refills: 0 | Status: SHIPPED | OUTPATIENT
Start: 2018-11-01 | End: 2018-11-26 | Stop reason: SDUPTHER

## 2018-11-01 RX ORDER — RIZATRIPTAN BENZOATE 10 MG/1
10 TABLET ORAL ONCE AS NEEDED
COMMUNITY
End: 2019-02-21 | Stop reason: ALTCHOICE

## 2018-11-01 NOTE — PATIENT INSTRUCTIONS
Headache management instructions  - When patient has a moderate to severe headache, they should seek rest, initiate relaxation and apply cold compresses to the head  - Maintain regular sleep schedule  Adults need at least 7-8 hours of uninterrupted a night  - Limit over the counter medications such as Tylenol, Ibuprofen, Aleve, Excedrin  (No more than 3 times a week)  - Maintain headache diary  We discussed an ROHIT for a smart phone is "Migraine e Diary"  - Limit caffeine to 1-2 cups a day or less  - Avoid dietary trigger  (aged cheese, peanuts, MSG, aspartame and nitrates)  - Patient is to have regular frequent meals to prevent headache onset  - Please drink at least 64 ounces of water a day to help remain hydrated  Please call with any questions or concerns

## 2018-11-26 DIAGNOSIS — G44.229 CHRONIC TENSION-TYPE HEADACHE, NOT INTRACTABLE: ICD-10-CM

## 2018-11-26 RX ORDER — TIZANIDINE HYDROCHLORIDE 2 MG/1
CAPSULE, GELATIN COATED ORAL
Qty: 90 CAPSULE | Refills: 0 | Status: SHIPPED | OUTPATIENT
Start: 2018-11-26 | End: 2019-03-15 | Stop reason: SDUPTHER

## 2018-11-26 NOTE — TELEPHONE ENCOUNTER
Received 90 day refill request for tizanidine 2mg from [facility]  Per last office note, pt to take for 30 days and pt to continue if beneficial  I spoke with pt who confirms it has helped and would like to continue
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
## 2019-02-21 ENCOUNTER — OFFICE VISIT (OUTPATIENT)
Dept: NEUROLOGY | Facility: CLINIC | Age: 39
End: 2019-02-21
Payer: COMMERCIAL

## 2019-02-21 VITALS
HEIGHT: 63 IN | WEIGHT: 132 LBS | SYSTOLIC BLOOD PRESSURE: 98 MMHG | BODY MASS INDEX: 23.39 KG/M2 | DIASTOLIC BLOOD PRESSURE: 59 MMHG | HEART RATE: 87 BPM

## 2019-02-21 DIAGNOSIS — G50.1 ATYPICAL FACIAL PAIN: Primary | ICD-10-CM

## 2019-02-21 DIAGNOSIS — G43.009 MIGRAINE WITHOUT AURA AND WITHOUT STATUS MIGRAINOSUS, NOT INTRACTABLE: ICD-10-CM

## 2019-02-21 PROCEDURE — 99214 OFFICE O/P EST MOD 30 MIN: CPT | Performed by: PSYCHIATRY & NEUROLOGY

## 2019-02-21 RX ORDER — IBUPROFEN 200 MG
400 TABLET ORAL ONCE AS NEEDED
COMMUNITY

## 2019-02-21 RX ORDER — TOPIRAMATE 100 MG/1
1 CAPSULE, EXTENDED RELEASE ORAL DAILY
Qty: 30 CAPSULE | Refills: 3 | Status: SHIPPED | OUTPATIENT
Start: 2019-02-21 | End: 2019-08-20 | Stop reason: SDUPTHER

## 2019-02-22 ENCOUNTER — TELEPHONE (OUTPATIENT)
Dept: NEUROLOGY | Facility: CLINIC | Age: 39
End: 2019-02-22

## 2019-02-22 DIAGNOSIS — G50.1 ATYPICAL FACIAL PAIN: Primary | ICD-10-CM

## 2019-02-22 DIAGNOSIS — H83.2X9 VESTIBULAR DISEQUILIBRIUM, UNSPECIFIED LATERALITY: Primary | ICD-10-CM

## 2019-02-22 NOTE — TELEPHONE ENCOUNTER
Patient is requesting a referral to [facility] in  [facility] for vestibular therapy  Their number is [phone]    She is requesting a call back

## 2019-02-26 NOTE — TELEPHONE ENCOUNTER
Gallito Campoverde from [address] [place] [address] called and states that pt is currently being seen for OT vision therapy at [address] [place] [address]  She received a PT script for vestibular therapy  Gallito Campoverde states this script should be OT referral, not PT  Requesting OT referral for vestibular therapy  Faxed most recent office notes to [address] [place] [address] @[phone] as requested

## 2019-03-15 DIAGNOSIS — G44.229 CHRONIC TENSION-TYPE HEADACHE, NOT INTRACTABLE: ICD-10-CM

## 2019-03-15 RX ORDER — TIZANIDINE HYDROCHLORIDE 2 MG/1
CAPSULE, GELATIN COATED ORAL
Qty: 90 CAPSULE | Refills: 0 | Status: SHIPPED | OUTPATIENT
Start: 2019-03-15 | End: 2019-08-20

## 2019-04-19 ENCOUNTER — TELEPHONE (OUTPATIENT)
Dept: NEUROLOGY | Facility: CLINIC | Age: 39
End: 2019-04-19

## 2019-05-31 ENCOUNTER — OFFICE VISIT (OUTPATIENT)
Dept: NEUROLOGY | Facility: CLINIC | Age: 39
End: 2019-05-31
Payer: COMMERCIAL

## 2019-05-31 VITALS
SYSTOLIC BLOOD PRESSURE: 144 MMHG | HEIGHT: 63 IN | HEART RATE: 61 BPM | DIASTOLIC BLOOD PRESSURE: 59 MMHG | WEIGHT: 125.8 LBS | BODY MASS INDEX: 22.29 KG/M2

## 2019-05-31 DIAGNOSIS — R42 DIZZINESS AND GIDDINESS: Primary | ICD-10-CM

## 2019-05-31 DIAGNOSIS — G44.229 CHRONIC TENSION-TYPE HEADACHE, NOT INTRACTABLE: ICD-10-CM

## 2019-05-31 DIAGNOSIS — E55.9 VITAMIN D DEFICIENCY: ICD-10-CM

## 2019-05-31 DIAGNOSIS — G43.009 MIGRAINE WITHOUT AURA AND WITHOUT STATUS MIGRAINOSUS, NOT INTRACTABLE: ICD-10-CM

## 2019-05-31 DIAGNOSIS — G50.1 ATYPICAL FACIAL PAIN: ICD-10-CM

## 2019-05-31 PROCEDURE — 99214 OFFICE O/P EST MOD 30 MIN: CPT | Performed by: PSYCHIATRY & NEUROLOGY

## 2019-05-31 RX ORDER — GABAPENTIN 100 MG/1
CAPSULE ORAL
Qty: 90 CAPSULE | Refills: 2 | Status: SHIPPED | OUTPATIENT
Start: 2019-05-31 | End: 2019-08-20

## 2019-06-19 ENCOUNTER — APPOINTMENT (OUTPATIENT)
Dept: LAB | Facility: HOSPITAL | Age: 39
End: 2019-06-19
Attending: PSYCHIATRY & NEUROLOGY
Payer: COMMERCIAL

## 2019-06-19 DIAGNOSIS — E55.9 VITAMIN D DEFICIENCY: ICD-10-CM

## 2019-06-19 DIAGNOSIS — G43.009 MIGRAINE WITHOUT AURA AND WITHOUT STATUS MIGRAINOSUS, NOT INTRACTABLE: ICD-10-CM

## 2019-06-19 DIAGNOSIS — E55.9 VITAMIN D DEFICIENCY: Primary | ICD-10-CM

## 2019-06-19 LAB
25(OH)D3 SERPL-MCNC: 16.5 NG/ML (ref 30–100)
TSH SERPL DL<=0.05 MIU/L-ACNC: 1.3 UIU/ML (ref 0.36–3.74)
VIT B12 SERPL-MCNC: 405 PG/ML (ref 100–900)

## 2019-06-19 PROCEDURE — 84443 ASSAY THYROID STIM HORMONE: CPT

## 2019-06-19 PROCEDURE — 36415 COLL VENOUS BLD VENIPUNCTURE: CPT

## 2019-06-19 PROCEDURE — 82306 VITAMIN D 25 HYDROXY: CPT

## 2019-06-19 PROCEDURE — 82607 VITAMIN B-12: CPT

## 2019-06-19 RX ORDER — ERGOCALCIFEROL 1.25 MG/1
50000 CAPSULE ORAL WEEKLY
Qty: 12 CAPSULE | Refills: 0 | Status: SHIPPED | OUTPATIENT
Start: 2019-06-19 | End: 2019-12-27

## 2019-06-20 ENCOUNTER — TELEPHONE (OUTPATIENT)
Dept: NEUROLOGY | Facility: CLINIC | Age: 39
End: 2019-06-20

## 2019-07-02 ENCOUNTER — TELEPHONE (OUTPATIENT)
Dept: NEUROLOGY | Facility: CLINIC | Age: 39
End: 2019-07-02

## 2019-07-02 NOTE — TELEPHONE ENCOUNTER
Have her try to take 200 mg at bedtime to see if this decreases her fatigue    She she keep us updated

## 2019-07-02 NOTE — TELEPHONE ENCOUNTER
Pt called and states that since starting gabapentin, her energy level had decreased  More tired in the afternoon and usually last all afternoon  "I feel run down"  Pt is currently taking gabapentin 100 mg bid instead of tid  Pt states that it's hard to say if it's helping her migraine  "my migraine is inconsistent so it's hard to say, but I never had one for a couple weeks"  Pt is asking if ok to keep taking it bid or should she decreased it to daily at bedtime? Or try alt meds?               [phone]

## 2019-08-05 NOTE — PROGRESS NOTES
[facility]  PATIENT:  Puja Ambriz  MRN:  [id number]  :  1980  DATE OF SERVICE:  2019      Assessment/Plan:        Problem List Items Addressed This Visit        Cardiovascular and Mediastinum    Migraine without aura and without status migrainosus, not intractable - Primary       Nervous and Auditory    Tension headache, chronic       Other    Atypical facial pain    Dizziness and giddiness              Migraine headaches:  - preventive therapy:  Continue Trokendi  mg at bedtime  - abortive therapy:   OT sees which have been helping    Sharp shooting pain:  She has not noted the gabapentin is helping with this and is not really affecting her quality of life  Thus will stop the gabapentin at this time  Dizziness: Follows with ENT at this time is getting workup done      History of Present Illness: We had the pleasure of evaluating Magui Antonio in neurological follow up today   As you know,  she is a 38 y  o right handed female  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 Less then 5 minutes to seconds  Associated: blurry vision  Treatment done:  PT completed and PT on her own at home  Following ENT at this time    Cervicalgia/occipital neck pain  How often does it occur? Once a week  How long does it last? 10 minutes  What time of the day does it occur? Middle of the night  Describe ear pain:  Sharp shooting  Associated: right occipital pain     Right facial numbness/pain and arm numbness:   - started after head injury in 2018   - she was told by her dentist that it is TMJ  she also has pain on the v2 distribution and it occurs with concentration   She states doing occular therapy and that may have made her symptoms worse     pain intensity: 2/10    Migraine headache/Tension therapy:   What medications do you take or have you taken for your headaches? PREVENTIVE:    - Topamax extended release, Gabapentin 200 mg at bedtime,   - Tizanidine (lethargy)  ABORTIVE:    - Naproxen, Maxalt,     What is your current pain level - 0/10     How often do the headaches occur ? Sharp and shooting headaches: twice a week  Migraine headache - 1-2 times a month for the last 2 months     What time of the day do the headaches start -   Sharp and shooting headaches: anytime of the day  Migraine headache : anytime of the day    How long do the headaches last -   Sharp and shooting headaches: 1-2 hours and without medication all day  Migraine headache - one day of bad headaches and then starts to get better,  ibuprofen 200 mg      Where are they located -   Sharp and shooting headaches: right orbital pain on the lateral aspect  Migraine headache - band-like over the forehead and back of the head,     What is the intensity of pain -   Sharp and shooting pain: 6-7/10  Migraine headaches: 7-8/10     Aura and how long does it last - blurry vision with migraine - last only a few minutes  Are you ever headache free - Yes      Describe your usual headache -   Sharp and shooting pain: intermittent   Migraine headaches:  throbbing and pounding    Associated symptoms:   · Sensitivity to bright lights and sound  · Problem with concentration  · Blurred vision  · Lacrimation  · Tinnitus, light-headed  · Right hand tingles - intermittently  · Prefer to be alone and in a dark room  ·    Headache are worse if the patient: bending over, running or exercising     Headache trigger: none   Though reports that running dose make her feel more nauseous     Have you used CBD or THC for your headaches and how often? No  How much caffeine to you consume per day? One cup of coffee per day  Non-Medical/Alternative Treatments used in the past for headaches: None     08/07/2018 MRI head with and without contrast:  Geofm Roche PARENCHYMA: Kathleen Baileyed is no discrete mass, mass effect or midline shift  No abnormal white matter signal identified  Brainstem and cerebellum demonstrate normal signal  There is no intracranial hemorrhage  Kathleen Baileyed is no evidence of acute infarction and diffusion imaging is unremarkable   Normal postcontrast imaging  BRAINSTEM:  Normal trigeminal nerves   normal signal and no enhancement   Meckles cave is normal   No evidence of vascular compression  No CP angle mass   VENTRICLES: Karon Braver AND PITUITARY GLAND:  Normal  ORBITS:  Normal  PARANASAL SINUSES:  Trace sinus mucosal disease VASCULATURE:  Evaluation of the major intracranial vasculature demonstrates appropriate flow voids  CALVARIUM AND SKULL BASE: Normal  EXTRACRANIAL SOFT TISSUES:  Normal  IMPRESSION:  No acute disease    Past Medical History:   Diagnosis Date    Concussion 04/28/2018    Headache     Migraine     Neck pain     Post concussion syndrome        Patient Active Problem List   Diagnosis    Tension headache, chronic    Atypical facial pain    Migraine without aura and without status migrainosus, not intractable    Dizziness and giddiness       Medications:      Current Outpatient Medications   Medication Sig Dispense Refill    Cyanocobalamin (VITAMIN B 12 PO) Take by mouth daily      ergocalciferol (VITAMIN D2) 50,000 units Take 1 capsule (50,000 Units total) by mouth once a week 12 capsule 0    fluticasone (FLONASE) 50 mcg/act nasal spray 2 sprays as needed   5    gabapentin (NEURONTIN) 100 mg capsule One tab at bedtime for 3 days then one tab twice a day for 3 days then one tab three times a day after that   (Patient taking differently: 100 mg Two tabs at bedtime) 90 capsule 2    ibuprofen (MOTRIN) 200 mg tablet Take 400 mg by mouth once as needed for mild pain      TROKENDI  MG CP24 Take 1 capsule (100 mg total) by mouth daily 30 capsule 3    TiZANidine (ZANAFLEX) 2 MG capsule 1 at bedtime daily (Patient not taking: Reported on 6/28/2019) 90 capsule 0     No current facility-administered medications for this visit           Allergies:    No Known Allergies    Family History:     Family History   Problem Relation Age of Onset   [facility] Cancer Mother     ALS Father     Cancer Maternal Grandmother        Social History:     Social History     Socioeconomic History    Marital status: /Civil Union     Spouse name: Not on file    Number of children: Not on file    Years of education: Not on file    Highest education level: Not on file   Occupational History    Not on file   Social Needs    Financial resource strain: Not on file    Food insecurity:     Worry: Not on file     Inability: Not on file    Transportation needs:     Medical: Not on file     Non-medical: Not on file   Tobacco Use    Smoking status: Never Smoker    Smokeless tobacco: Never Used   Substance and Sexual Activity    Alcohol use: Yes     Comment: socially     Drug use: No    Sexual activity: Not on file   Lifestyle    Physical activity:     Days per week: Not on file     Minutes per session: Not on file    Stress: Not on file   Relationships    Social connections:     Talks on phone: Not on file     Gets together: Not on file     Attends Sabianist service: Not on file     Active member of club or organization: Not on file     Attends meetings of clubs or organizations: Not on file     Relationship status: Not on file    Intimate partner violence:     Fear of current or ex partner: Not on file     Emotionally abused: Not on file     Physically abused: Not on file     Forced sexual activity: Not on file   Other Topics Concern    Not on file   Social History Narrative    Not on file         Objective:   Physical Exam:                                                                   Vitals:            /64 (BP Location: Left arm, Patient Position: Sitting, Cuff Size: Adult)   Ht 5' 3" (1 6 m)   Wt 57 6 kg (126 lb 14 4 oz)   BMI 22 48 kg/m² BP Readings from Last 3 Encounters:   08/07/19 102/64   06/28/19 98/62   05/31/19 144/59     Pulse Readings from Last 3 Encounters:   05/31/19 61   02/21/19 87            CONSTITUTIONAL: Well developed, well nourished, well groomed  No dysmorphic features  Eyes:  PERRLA, EOM normal      Neck:  Normal ROM, neck supple  HEENT:  Normocephalic atraumatic  No meningismus  Oropharynx is clear and moist  No oral mucosal lesions  Chest:  Respirations regular and unlabored  Cardiovascular:  Distal extremities warm without palpable edema or tenderness, no observed significant swelling  Musculoskeletal:  Full range of motion  Skin:  warm and dry   Psychiatric:  Normal behavior and appropriate affect        Neurological Examination:   Mental status/cognitive function: Orientated to time, place and person  Cranial Nerves: 2 to 12 intact  Motor Exam:    5/5 upper extremity  5/5 lower extremity  Sensory: grossly intact light touch in all extremities  Reflexes:   2/4 upper extremity  2/4 lower extremity  No clonus noted  Coordination: Finger to nose intact bilaterally, no tremor noted  Gait: steady casual  gait, able to do tandem gait, romberg negative  Review of Systems:   Review of Systems   Constitutional: Positive for fatigue  HENT: Positive for tinnitus  Noise sensitivity    Eyes: Positive for photophobia (light sensitivy )  Respiratory: Negative  Cardiovascular: Negative  Gastrointestinal: Negative  Endocrine: Negative  Genitourinary: Negative  Musculoskeletal: Positive for neck pain and neck stiffness  Skin: Negative  Allergic/Immunologic: Negative  Neurological: Positive for dizziness, light-headedness and headaches  Hematological: Negative  Psychiatric/Behavioral: Negative          I personally reviewed and updated the ROS that was entered by the medical assistant       I have spent 30 minutes with Patient  today in which greater than 50% of this time was spent in counseling/coordination of care regarding Diagnostic results, Prognosis, Risks and benefits of tx options, Intructions for management, Patient and family education, Importance of tx compliance, Risk factor reductions, Impressions and Plan of care as above        Author:  Sandy Hernandez MD 8/8/2019 9:45 AM

## 2019-08-07 ENCOUNTER — OFFICE VISIT (OUTPATIENT)
Dept: NEUROLOGY | Facility: CLINIC | Age: 39
End: 2019-08-07
Payer: COMMERCIAL

## 2019-08-07 VITALS
BODY MASS INDEX: 22.48 KG/M2 | HEIGHT: 63 IN | WEIGHT: 126.9 LBS | DIASTOLIC BLOOD PRESSURE: 64 MMHG | SYSTOLIC BLOOD PRESSURE: 102 MMHG

## 2019-08-07 DIAGNOSIS — G50.1 ATYPICAL FACIAL PAIN: ICD-10-CM

## 2019-08-07 DIAGNOSIS — G43.009 MIGRAINE WITHOUT AURA AND WITHOUT STATUS MIGRAINOSUS, NOT INTRACTABLE: Primary | ICD-10-CM

## 2019-08-07 DIAGNOSIS — G44.229 CHRONIC TENSION-TYPE HEADACHE, NOT INTRACTABLE: ICD-10-CM

## 2019-08-07 DIAGNOSIS — R42 DIZZINESS AND GIDDINESS: ICD-10-CM

## 2019-08-07 PROCEDURE — 99214 OFFICE O/P EST MOD 30 MIN: CPT | Performed by: PSYCHIATRY & NEUROLOGY

## 2019-08-12 ENCOUNTER — OFFICE VISIT (OUTPATIENT)
Dept: AUDIOLOGY | Age: 39
End: 2019-08-12
Payer: COMMERCIAL

## 2019-08-12 DIAGNOSIS — H81.49: Primary | ICD-10-CM

## 2019-08-12 PROCEDURE — 92567 TYMPANOMETRY: CPT | Performed by: AUDIOLOGIST

## 2019-08-12 PROCEDURE — 92540 BASIC VESTIBULAR EVALUATION: CPT | Performed by: AUDIOLOGIST

## 2019-08-12 PROCEDURE — 92537 CALORIC VSTBLR TEST W/REC: CPT | Performed by: AUDIOLOGIST

## 2019-08-20 DIAGNOSIS — G43.009 MIGRAINE WITHOUT AURA AND WITHOUT STATUS MIGRAINOSUS, NOT INTRACTABLE: ICD-10-CM

## 2019-08-20 RX ORDER — TOPIRAMATE 100 MG/1
CAPSULE, EXTENDED RELEASE ORAL
Qty: 30 CAPSULE | Refills: 3 | Status: SHIPPED | OUTPATIENT
Start: 2019-08-20 | End: 2019-12-27 | Stop reason: SDUPTHER
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
## 2019-08-21 PROBLEM — F07.81 POST-CONCUSSION VERTIGO: Chronic | Status: ACTIVE | Noted: 2019-08-21

## 2019-08-21 PROBLEM — R42 POST-CONCUSSION VERTIGO: Chronic | Status: ACTIVE | Noted: 2019-08-21

## 2019-08-26 DIAGNOSIS — G43.009 MIGRAINE WITHOUT AURA AND WITHOUT STATUS MIGRAINOSUS, NOT INTRACTABLE: ICD-10-CM

## 2019-08-26 RX ORDER — GABAPENTIN 100 MG/1
CAPSULE ORAL
Qty: 270 CAPSULE | Refills: 0 | Status: SHIPPED | OUTPATIENT
Start: 2019-08-26 | End: 2019-12-27

## 2019-12-27 ENCOUNTER — OFFICE VISIT (OUTPATIENT)
Dept: NEUROLOGY | Facility: CLINIC | Age: 39
End: 2019-12-27
Payer: COMMERCIAL

## 2019-12-27 ENCOUNTER — CLINICAL SUPPORT (OUTPATIENT)
Dept: URGENT CARE | Facility: CLINIC | Age: 39
End: 2019-12-27
Payer: COMMERCIAL

## 2019-12-27 VITALS
BODY MASS INDEX: 24.59 KG/M2 | HEIGHT: 63 IN | WEIGHT: 138.8 LBS | DIASTOLIC BLOOD PRESSURE: 61 MMHG | HEART RATE: 69 BPM | SYSTOLIC BLOOD PRESSURE: 103 MMHG

## 2019-12-27 DIAGNOSIS — G43.009 MIGRAINE WITHOUT AURA AND WITHOUT STATUS MIGRAINOSUS, NOT INTRACTABLE: ICD-10-CM

## 2019-12-27 DIAGNOSIS — G50.1 ATYPICAL FACIAL PAIN: ICD-10-CM

## 2019-12-27 DIAGNOSIS — R42 DIZZINESS AND GIDDINESS: Primary | ICD-10-CM

## 2019-12-27 PROBLEM — E55.9 VITAMIN D DEFICIENCY: Status: ACTIVE | Noted: 2019-12-27

## 2019-12-27 LAB
ATRIAL RATE: 55 BPM
P AXIS: -1 DEGREES
PR INTERVAL: 164 MS
QRS AXIS: 43 DEGREES
QRSD INTERVAL: 74 MS
QT INTERVAL: 420 MS
QTC INTERVAL: 401 MS
T WAVE AXIS: 18 DEGREES
VENTRICULAR RATE: 55 BPM

## 2019-12-27 PROCEDURE — 93010 ELECTROCARDIOGRAM REPORT: CPT | Performed by: INTERNAL MEDICINE

## 2019-12-27 PROCEDURE — 93005 ELECTROCARDIOGRAM TRACING: CPT

## 2019-12-27 PROCEDURE — 99214 OFFICE O/P EST MOD 30 MIN: CPT | Performed by: PHYSICIAN ASSISTANT

## 2019-12-27 RX ORDER — MELATONIN
1000 DAILY
COMMUNITY

## 2019-12-27 RX ORDER — TOPIRAMATE 100 MG/1
1 CAPSULE, EXTENDED RELEASE ORAL DAILY
Qty: 90 CAPSULE | Refills: 3 | Status: SHIPPED | OUTPATIENT
Start: 2019-12-27 | End: 2021-01-07 | Stop reason: SDUPTHER

## 2019-12-27 RX ORDER — RIZATRIPTAN BENZOATE 10 MG/1
10 TABLET ORAL AS NEEDED
Qty: 9 TABLET | Refills: 3 | Status: SHIPPED | OUTPATIENT
Start: 2019-12-27 | End: 2020-07-29

## 2019-12-27 NOTE — ASSESSMENT & PLAN NOTE
Continue taking trokendi xr 100mg at this time  At onset of migraine, take Rizatriptan, may repeat in 2 hours if needed  Limit of 3 a week or 9 a month     Sharp shooting pain:  Monitor for worsening    Try Ibuprofen or may try gabapentin 100-300 mg at onset of this pain

## 2019-12-27 NOTE — PROGRESS NOTES
Review of Systems   Constitutional: Negative  HENT: Negative  Eyes: Positive for visual disturbance (blurry vision )  Respiratory: Negative  Cardiovascular: Negative  Gastrointestinal: Negative  Endocrine: Negative  Genitourinary: Negative  Musculoskeletal: Positive for neck pain and neck stiffness  Skin: Negative  Allergic/Immunologic: Negative  Neurological: Positive for light-headedness and headaches  Hematological: Negative  Psychiatric/Behavioral: Positive for decreased concentration and sleep disturbance (difficulty staying asleep )

## 2019-12-27 NOTE — PATIENT INSTRUCTIONS
Migraine headaches:  Continue taking trokendi xr 100mg at this time  At onset of migraine, take Rizatriptan, may repeat in 2 hours if needed  Limit of 3 a week or 9 a month     Sharp shooting pain:  Monitor for worsening  Try Ibuprofen or may try gabapentin 100-300 mg at onset of this pain    We will order vestibular therapy for the dizziness    Increase vitamin D3 to 5000 units daily    Neck pain:   - We discussed the role of neck pathology and poor posture, with straightening of the normal cervical lordosis, in headaches  We discussed how tightening of the neck muscles can irritate the nerves in the occipital region of her head and cause or worsen head pain  We also discussed and demonstrated neck strengthening and relaxation exercises, as well as giving written instructions on these exercises  - We talked about the importance of good posture for improving shoulder, neck, and head pain  The patient was given visualization exercises for correcting posture, which patient will practice at home  If this simple exercise does not help improve the posture, we will consider formal physical therapy in the future  Medication overuse headaches:   - We discussed medication overuse headache [facility]) and how to avoid it in the future  It was explained that all analgesics have the potential to cause medication overuse headache [facility]) and analgesic overuse can negate the effectiveness of headache preventive measures  After successful [address] treatment, preventive medications for an underlying primary headache disorder have a greater chance for success  Avoid medications with narcotics, barbiturates, or caffeine in them as these can cause rebound headaches after very few doses and can interfere with other headache medicine efficacy  Taking any analgesics for more than 2-3 days a week can cause medication overuse headache       Reproductive age women: Should take folic acid daily when taking anti-seizure drugs especially [facility] Prescription Drug Monitoring Program report was reviewed and was appropriate      Headache management instructions  - When patient has a moderate to severe headache, they should seek rest, initiate relaxation and apply cold compresses to the head  - Maintain regular sleep schedule  Adults need at least 7-8 hours of uninterrupted a night  - Limit over the counter medications such as Tylenol, Ibuprofen, Aleve, Excedrin  (No more than 3 times a week)  - Maintain headache diary  We discussed an ROHIT for a smart phone is "Migraine fernando"  - Limit caffeine to 1-2 cups 8 to 16 oz a day or less  - Avoid dietary trigger  (aged cheese, peanuts, MSG, aspartame and nitrates)  - Patient is to have regular frequent meals to prevent headache onset  - Please drink at least 64 ounces of water a day to help remain hydrated  Please call with any questions or concerns   Office number is [phone]
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
## 2020-01-24 ENCOUNTER — TELEPHONE (OUTPATIENT)
Dept: NEUROLOGY | Facility: CLINIC | Age: 40
End: 2020-01-24

## 2020-01-24 DIAGNOSIS — G43.009 MIGRAINE WITHOUT AURA AND WITHOUT STATUS MIGRAINOSUS, NOT INTRACTABLE: Primary | ICD-10-CM

## 2020-01-24 RX ORDER — PROCHLORPERAZINE MALEATE 10 MG
10 TABLET ORAL EVERY 6 HOURS PRN
Qty: 10 TABLET | Refills: 0 | Status: SHIPPED | OUTPATIENT
Start: 2020-01-24 | End: 2020-02-20 | Stop reason: SDUPTHER

## 2020-01-24 RX ORDER — DIVALPROEX SODIUM 250 MG/1
250 TABLET, EXTENDED RELEASE ORAL
Qty: 8 TABLET | Refills: 0 | Status: SHIPPED | OUTPATIENT
Start: 2020-01-24 | End: 2020-03-03

## 2020-01-24 RX ORDER — DEXAMETHASONE 1 MG
1 TABLET ORAL
Qty: 5 TABLET | Refills: 0 | Status: SHIPPED | OUTPATIENT
Start: 2020-01-24 | End: 2020-03-03

## 2020-01-24 NOTE — TELEPHONE ENCOUNTER
Left detailed message on patient's voicemail regarding the below and to call back if she has any questions or concerns

## 2020-01-24 NOTE — TELEPHONE ENCOUNTER
Let's have her take Decadron 1 mg for 3-5 days and prochlorperazine 10 mg q 8 hr x 3 doses then prn, Depakote 250 mg 2 tabs for 3 nights then 1 tab for 2 nights

## 2020-01-24 NOTE — TELEPHONE ENCOUNTER
Pt called c/o migraine   Took ibuprofen around 0300-no effective  Took rizatriptan 3 days in a row, last dose this morning around 0600 but not effective  When did migraine start? 3 days ago     Location/Description: pounding right side of face, eye and back of head 'my head feels heavy"     Pain scale: 4/10    Associated symptoms:nausea and sound sensitivity    Precipitating factors: unsure    What medications have you tried for this migraine headache? PREVENTIVE:    - Topamax extended release, Gabapentin 200 mg at bedtime,   - Tizanidine (lethargy)  ABORTIVE:    - Naproxen,   - Maxalt,    Educated the patient that we do not recommend they take any triptan or OTC med more than 3 days in any 1 week due to medication over use headache and CVA risk with overuse   Pt verbalized understanding    Current migraine medications are confirmed as:  Trokendi  mg daily    Medications tried in the past? Not tried decadron (steroid), depakote, or olanzapine         [phone] ok to leave detailed message

## 2020-02-19 ENCOUNTER — TELEPHONE (OUTPATIENT)
Dept: NEUROLOGY | Facility: CLINIC | Age: 40
End: 2020-02-19

## 2020-02-19 NOTE — TELEPHONE ENCOUNTER
Left detailed message for patient making her aware of below  Asked her to call our office back to schedule an appointment with Dr Tanner Neves
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
## 2020-02-20 DIAGNOSIS — G43.009 MIGRAINE WITHOUT AURA AND WITHOUT STATUS MIGRAINOSUS, NOT INTRACTABLE: ICD-10-CM

## 2020-02-20 RX ORDER — PROCHLORPERAZINE MALEATE 10 MG
10 TABLET ORAL EVERY 6 HOURS PRN
Qty: 10 TABLET | Refills: 0 | Status: SHIPPED | OUTPATIENT
Start: 2020-02-20

## 2020-02-20 NOTE — TELEPHONE ENCOUNTER
Patient came into [facility] office to request copy of last AVS and telephone conversation from 1/24  Had patient sign SOURAV  Also scheduled patient for 1 hour with Dr Marleen Martins on 3/3 @ 2:00 in [facility] office

## 2020-02-20 NOTE — TELEPHONE ENCOUNTER
Patient stopped in [facility] office asking for a refill on the Copmpazine 10 mg, 1 tablet every 6 hrs prn for migraines  Patient states that this medication seems to be working well for her  Please send refill to [facility] in [place] - [address] in [facility]  Please call patient to let her know when this is sent  She can be reached at [phone]

## 2020-03-02 NOTE — PROGRESS NOTES
[facility]   PATIENT:  Yolanda Silva  MRN:  [id number]  :  1980  DATE OF SERVICE:  3/3/2020  REFERRED BY: Nick Rudolph PA-C  PMD: Ariel Escobedo MD    Assessment:     Yolanda Silva is a delightful 35 y o  female with a past medical history that includes vitamin-D deficiency, atypical facial pain, migraines, chronic tension headaches referred here for evaluation of mild TBI/concussion  She follows with my neurology colleagues  My initial evaluation 2020    Functional neurological symptom disorder with mixed symptoms  H/O remote concussion  Persistent postural-perceptual dizziness  Anxiety   On 2018, a large metal piece fell down hitting the top of her head resulting in and typical acute concussion symptoms as well as laceration requiring 13 staples  She was subsequently evaluated in the emergency department, PCP, [facility], did OT, PT, "vision therapy", vestibular therapy, was evaluated by ENT and has been following with my neurology colleagues  - as of 2020 she reports she had gradual improvement of symptoms, but continues to have headaches with migrainous (had prior) and functional features, heaviness and tenderness on head where she was hit (possible functional vs nerve injury), fogginess, paresthesias right cheek, feels her short-term memory is not what he used to be, photophobia, phonophobia, occasional lightheadedness in am or when getting up from crouching down (sounds like PPPD)  Workup:  - per report noncontrast head CT 2018 unremarkable  - MRI brain with without contrast 2018: Unremarkable brain MRI  - she reports she has had multiple maybe 3 MRI brains that have been unremarkable since injury    We have discussed concussions and the natural course of recovery   We have discussed that symptoms from a concussion typically take 2 weeks to resolve, and although sometimes it can feel like concussion symptoms linger on, at this point these symptoms would be related to contributing factors  - Contributing factors may include:  Prolonged removal from normal routine (was out of work for 4 months), preexisting chronic headaches or migraines, anxiety or depression, stress, lack of conditioning   - I have recommended gradual return of cognitive and physical activity  - We discussed that newer research regarding concussion shows that the sooner one returns gradually to their normal physical and cognitive routine, the sooner one tends to recover  Prolonged removal from normal routine and deconditioning have been shown to prolong symptoms    - We discussed that sometimes this constellation of symptoms is referred to as "post concussion syndrome," but I prefer not to use this term since that can be misleading and make people think they are still brain injured or "concussed," when the most common and likely etiology this far out from the head trauma is either contributing factors or a form of functional neurologic disorder with mixed symptoms, especially after a thorough workup to rule out other etiologies since concussion would not be the direct cause at this point    - We discussed how cognitive issues can have multiple causes and often related to multifactorial etiologies including stress, anxiety,  mood, pain, hypervigilance  and sleep issues and provided reassurance that, it is not likely the cognitive dysfunction is related to brain injury at this point    - Safe driving precautions -she should not drive at all if feeling sleepy or cognitively not well          Headache Preventive:  - Discussed headache hygiene and lifestyle factors that may improve headaches   - Discussed some headache preventative supplements that could be considered as below  - she follows with my neurology colleagues and is on long-acting topiramate which she was started on prior to the injury around 2017   - Discussed how cognitive behavioral therapy can help with many symptoms, recommended considering listening to the neuroscience of pain/discomfort lectures on [employer]  Headache Abortive:  - Discussed not taking over-the-counter or prescription headache abortive more than 3 days per week to prevent medication overuse headache  - she is on multiple medications prescribed by my colleagues including prochlorperazine which is helping, dexamethasone and Depakote have also helped in the past, rizatriptan 10 mg used to help for her migraines, but do not help for these headaches      Patient inctructions:   As we discussed the concussion itself is long over and symptoms that remain are due to contributing factors including posttraumatic stress    [employer] - Download this free [employer] on your phone (they will offer subscription, but you do not have to do this)  - I recommend listening to the first approximately 5 or so lectures (more if you want) that are about 10-20 minutes long on the neuroscience of pain  - They discuss how pain works in the brain and steps to try and cure chronic pain    I recommend CBT  For your chronic symptoms  - consider Dr Lucas Brown [facility] behavioral health [phone]      Headache/migraine treatment:   Abortive medications (for immediate treatment of a headache): Ok to take ibuprofen or acetaminophen for headaches, but try to limit the amount and frequency that you are taking to avoid medication overuse/rebound headache  No change to current meds    Over the counter preventive supplements for headaches/migraines   (to take every day to help prevent headaches - not to take at the time of headache):  Consider adding one or more of these supplements - can get on Alonzo - or something called Mind ease  - Magnesium 400mg daily  - Can occasionally cause stomach upset - if so try at night, with food or stop, rarely can cause diarrhea if so stop    - Riboflavin (Vitamin B2) 400mg daily - FYI B2 may make your urine bright/neon yellow   AND/OR  - Herbal medication: Petasites/Butterbur 150 mg daily  (When choosing your Butterbur online, beware that there are some poor preps containing pyrrolizidine alkaloids (PAs) that can be harmful to the liver  Therefore, do not use butterbur products that are not certified and labeled as PA-free )    Prescription preventive medications for headaches/migraines   (to take every day to help prevent headaches - not to take at the time of headache): No change to current meds    Lifestyle Recommendations:  - Maintain good sleep hygiene  Going to bed and waking up at consistent times, avoiding excessive daytime naps, avoiding caffeinated beverages in the evening, avoid excessive stimulation in the evening and generally using bed primarily for sleeping  One hour before bedtime would recommend turning lights down lower, decreasing your activity (may read quietly, listen to music at a low volume)  When you get into bed, should eliminate all technology (no texting, emailing, playing with your phone, iPad or tablet in bed)  - Maintain good hydration  Drink  2L of fluid a day (4 typical small water bottles)  - Maintain good nutrition  In particular don't skip meals and eat balanced meals regularly  Education and Follow-up  - Please contact us if any questions or concerns arise  Of course, try to protect yourself from head injuries, and if any new concerning symptoms or significant blow to the head or body go to the emergency department  - Follow up with Dr Cr Obrien as needed        CC:   Geovanni Joyce is a 35y o  year old  right handed female who presents for evaluation following a possible concussion  History obtained from patient as well as available medical record review  This is a Concussion Case that may be under litigation  Patient understands that we will not be writing any letters or working with  on their behalf  However, we will continue to do our best to provide the best medical care possible  History of Present Illness:   Current medical illnesses or past medical history include vitamin-D deficiency, atypical facial pain, migraines, chronic tension headaches       Date/Specifics: On 04/28/2018, a piece of a large silo fell down and hit her on the top of the head  Acute symptoms included: no LOC, no amnesia, everything went blurry, she was pale, head pain, took ambulance out     Pt went to the ED at [facility]   - 13 staples in her head  - got up the next day and felt foggy  Followed up with PCP 4/30/18  - dx concussion    Noncontrast head CT 05/11/2018 unremarkable  MRI brain with without contrast 08/07/2018: Unremarkable brain MRI    She was initially evaluated by [facility] (I), did OT, PT, Occular therapy   She was evaluated by physical therapy and has been discharged  She has been evaluated by ENT  She has been following with my neurology colleagues Dr Jory Vysa and Too [facility]anan  Did vestibular therapy and it did not help     - as of 03/03/2020 she reports headaches, heaviness on had an fogginess, tingling right cheek, sensation changes where she was hit, feels her short-term memory is not what he used to be, photophobia, phonophobia, occasional lightheadedness in am or when getting up from crouching down    Work:  - out for 4 months and gradual return   - Special       Physical activity: trying to do yoga   Sleep: sleeping a lot, fatigue   Water: drinking a lot     Hobbies:  - used to: exercising at the gym, running, camping, outside activities, gardening   - not doing as much with kids, used to be so active      Lightheadedness:  Likely PPPD  Occasional in a m   Or when getting up from couch in down  Typically last a few seconds to a few minutes  Completed PT  Vestibular therapy made it worse  Evaluated by ENT who felt no peripheral cause      Headache - likely combination of migraines in functional neurologic disorder  Still has pain at the top of the head that feels sore  Pain going to the right side of the ear tingling in the right side of the face (she was told by her dentist that it is TMJ  she also has pain on the v2 distribution and it occurs with concentration   She states doing occular therapy and that may have made her symptoms worse )      Migraines 3 times a month  She follows with my neurology colleagues  Abortive:  Prochlorperazine 10 mg - working well  Rizatriptan-ineffective  Past:  Dexamethasone, Depakote, (Tizanidine (lethargy))    Preventive:    Topiramate extended release 100 mg (has been on for 3-4 years)  Past: Gabapentin 200 mg p o  Q h s  - made her feel funny                                            The following portions of the patient's history were reviewed in the system and updated as appropriate: allergies, current medications, past family history, past medical history, past social history, past surgical history and problem list       Past Medical History:       Past Medical History:   Diagnosis Date    Concussion 04/28/2018    Headache     Migraine     Neck pain     Post concussion syndrome      Patient Active Problem List   Diagnosis    Tension headache, chronic    Atypical facial pain    Migraine without aura and without status migrainosus, not intractable    Dizziness and giddiness    Vitamin D deficiency       Medications:      Current Outpatient Medications   Medication Sig Dispense Refill    cholecalciferol (VITAMIN D3) 1,000 units tablet Take 1,000 Units by mouth daily      Cyanocobalamin (VITAMIN B 12 PO) Take by mouth daily      fluticasone (FLONASE) 50 mcg/act nasal spray 2 sprays as needed   5    ibuprofen (MOTRIN) 200 mg tablet Take 400 mg by mouth once as needed for mild pain      prochlorperazine (COMPAZINE) 10 mg tablet Take 1 tablet (10 mg total) by mouth every 6 (six) hours as needed (migraine) 10 tablet 0    rizatriptan (MAXALT) 10 MG tablet Take 1 tablet (10 mg total) by mouth as needed for migraine May repeat in 2 hours if needed  Limit 3 a week or 9 a month 9 tablet 3    TROKENDI  MG CP24 Take 1 capsule (100 mg total) by mouth daily 90 capsule 3    dexamethasone (DECADRON) 1 mg tablet Take 1 tablet (1 mg total) by mouth daily with breakfast (Patient not taking: Reported on 3/3/2020) 5 tablet 0    divalproex sodium (DEPAKOTE ER) 250 mg 24 hr tablet Take 1 tablet (250 mg total) by mouth daily at bedtime 2 tablets at bedtime for 3 days then 1 tablet at bedtime for 2 days (Patient not taking: Reported on 3/3/2020) 8 tablet 0     No current facility-administered medications for this visit           Allergies:    No Known Allergies    Family History:        Family History   Problem Relation Age of Onset   Amado Russell Cancer Mother     ALS Father     Cancer Maternal Grandmother          Social History:       Social History     Socioeconomic History    Marital status: /Civil Union     Spouse name: Not on file    Number of children: Not on file    Years of education: Not on file    Highest education level: Not on file   Occupational History    Not on file   Social Needs    Financial resource strain: Not on file    Food insecurity:     Worry: Not on file     Inability: Not on file    Transportation needs:     Medical: Not on file     Non-medical: Not on file   Tobacco Use    Smoking status: Never Smoker    Smokeless tobacco: Never Used   Substance and Sexual Activity    Alcohol use: Yes     Comment: socially     Drug use: No    Sexual activity: Not on file   Lifestyle    Physical activity:     Days per week: Not on file     Minutes per session: Not on file    Stress: Not on file   Relationships    Social connections:     Talks on phone: Not on file     Gets together: Not on file     Attends Jainism service: Not on file     Active member of club or organization: Not on file     Attends meetings of clubs or organizations: Not on file     Relationship status: Not on file    Intimate partner violence:     Fear of current or ex partner: Not on file     Emotionally abused: Not on file     Physically abused: Not on file     Forced sexual activity: Not on file   Other Topics Concern    Not on file   Social History Narrative    Not on file       Objective:     Physical Exam:                                                                 Vitals:            Constitutional:    /75 (BP Location: Right arm, Patient Position: Sitting, Cuff Size: Adult)   Pulse 76   Resp 16   Ht 5' 3" (1 6 m)   Wt 64 9 kg (143 lb)   BMI 25 33 kg/m²   BP Readings from Last 3 Encounters:   03/03/20 114/75   12/27/19 103/61   08/20/19 108/70     Pulse Readings from Last 3 Encounters:   03/03/20 76   12/27/19 69   08/20/19 70         Well developed, well nourished, well groomed  No dysmorphic features  HEENT:  Normocephalic atraumatic  No meningismus  Oropharynx is clear and moist  No oral mucosal lesions  Chest:  Respirations regular and unlabored  Cardiovascular:  Regular rate, intact distal pulses  Distal extremities warm without palpable edema or tenderness, no observed significant swelling  Musculoskeletal:  Full range of motion  (see below under neurologic exam for evaluation of motor function and gait)   Skin:  warm and dry, not diaphoretic  No apparent birthmarks or stigmata of neurocutaneous disease  Psychiatric:  Normal behavior and appropriate affect        Neurological Examination:     Mental status/cognitive function:   Orientated to time, place and person  Recent and remote memory intact  Attention span and concentration as well as fund of knowledge are appropriate for age  Normal language and spontaneous speech  Cranial Nerves:  III, IV, VI-Pupils were equal, round, and reactive to light  Extraocular movements were full and conjugate without nystagmus   conjugate gaze, normal smooth pursuits, normal saccades   VII-facial expression symmetric, intact forehead wrinkle, strong eye closure, symmetric smile    VIII-hearing grossly intact bilaterally   Motor Exam: symmetric bulk throughout  no atrophy, fasciculations or abnormal movements noted  Coordination:  no apparent dysmetria, ataxia or tremor noted  Gait: steady casual gait     Pertinent lab results:   No routine labs in our system  06/19/2019 vitamin-D 16 5  B12 405  TSH 1 296    EKG 12/27/2019:  sinus bradycardia, rate 55,       Imaging:   I have personally reviewed imaging and radiology read     - per report noncontrast head CT 05/11/2018 unremarkable  - MRI brain with without contrast 08/07/2018: Unremarkable brain MRI  - has had 2-3 since hitting head      Review of Systems:   ROS obtained by medical assistant Personally reviewed and updated if indicated  Review of Systems   Constitutional: Negative  Negative for appetite change and fever  HENT: Negative  Negative for hearing loss, tinnitus, trouble swallowing and voice change  Eyes: Negative  Negative for photophobia and pain  Respiratory: Negative  Negative for shortness of breath  Cardiovascular: Negative  Negative for palpitations  Gastrointestinal: Negative  Negative for nausea and vomiting  Endocrine: Negative  Negative for cold intolerance and heat intolerance  Genitourinary: Negative  Negative for dysuria, frequency and urgency  Musculoskeletal: Negative  Negative for myalgias and neck pain  Skin: Negative  Negative for rash  Allergic/Immunologic: Negative  Neurological: Negative  Negative for dizziness, tremors, seizures, syncope, facial asymmetry, speech difficulty, weakness, light-headedness, numbness (tingling feeling on right side cheek) and headaches  Heaviness on head and fogginess   Hematological: Negative  Does not bruise/bleed easily  Psychiatric/Behavioral: Negative  Negative for confusion, hallucinations and sleep disturbance           I have spent 45 minutes with Patient  today in which greater than 50% of this time was spent in counseling/coordination of care regarding Diagnostic results, Prognosis, Risks and benefits of tx options, Intructions for management, Patient education, Risk factor reductions and Impressions        Author:  Isra Longoria MD   Fellowship trained Concussion Specialist

## 2020-03-03 ENCOUNTER — OFFICE VISIT (OUTPATIENT)
Dept: NEUROLOGY | Facility: CLINIC | Age: 40
End: 2020-03-03
Payer: COMMERCIAL

## 2020-03-03 VITALS
HEIGHT: 63 IN | RESPIRATION RATE: 16 BRPM | WEIGHT: 143 LBS | SYSTOLIC BLOOD PRESSURE: 114 MMHG | HEART RATE: 76 BPM | BODY MASS INDEX: 25.34 KG/M2 | DIASTOLIC BLOOD PRESSURE: 75 MMHG

## 2020-03-03 DIAGNOSIS — R42 PERSISTENT POSTURAL-PERCEPTUAL DIZZINESS: ICD-10-CM

## 2020-03-03 DIAGNOSIS — F44.7 FUNCTIONAL NEUROLOGICAL SYMPTOM DISORDER WITH MIXED SYMPTOMS: Primary | ICD-10-CM

## 2020-03-03 DIAGNOSIS — F41.9 ANXIETY DISORDER, UNSPECIFIED TYPE: ICD-10-CM

## 2020-03-03 DIAGNOSIS — Z87.820 H/O CONCUSSION: ICD-10-CM

## 2020-03-03 PROBLEM — F07.81 POST-CONCUSSION VERTIGO: Chronic | Status: RESOLVED | Noted: 2019-08-21 | Resolved: 2020-03-03

## 2020-03-03 PROBLEM — H81.8X9 PERSISTENT POSTURAL-PERCEPTUAL DIZZINESS: Status: ACTIVE | Noted: 2020-03-03

## 2020-03-03 PROCEDURE — 99215 OFFICE O/P EST HI 40 MIN: CPT | Performed by: PSYCHIATRY & NEUROLOGY

## 2020-03-03 NOTE — LETTER
March 3, 2020     Too Buchanan, [address] [facility] [address]    Patient: Trish Sargent   YOB: 1980   Date of Visit: 3/3/2020       Dear Dr Neda Nuñez:    Thank you for referring Trish Sargent to me for evaluation  Below are my notes for this consultation  If you have questions, please do not hesitate to call me  I look forward to following your patient along with you  Sincerely,        Sera Joyce MD        CC: MD Sera Gonzales MD  3/3/2020  3:00 PM  Sign at close encounter  [facility] Consult   PATIENT:  Trish Sargent  MRN:  [id number]  :  1980  DATE OF SERVICE:  3/3/2020  REFERRED BY: Zoe Guerrero PA-C  PMD: Jerardo Carbajal MD    Assessment:     Trish Sargent is a delightful [address]  female with a past medical history that includes vitamin-D deficiency, atypical facial pain, migraines, chronic tension headaches referred here for evaluation of mild TBI/concussion  She follows with my neurology colleagues  My initial evaluation 2020    Functional neurological symptom disorder with mixed symptoms  H/O remote concussion  Persistent postural-perceptual dizziness  Anxiety   On 2018, a large metal piece fell down hitting the top of her head resulting in and typical acute concussion symptoms as well as laceration requiring 13 staples  She was subsequently evaluated in the emergency department, PCP, [facility], did OT, PT, "vision therapy", vestibular therapy, was evaluated by ENT and has been following with my neurology colleagues    - as of 2020 she reports she had gradual improvement of symptoms, but continues to have headaches with migrainous (had prior) and functional features, heaviness and tenderness on head where she was hit (possible functional vs nerve injury), fogginess, paresthesias right cheek, feels her short-term memory is not what he used to be, photophobia, phonophobia, occasional lightheadedness in am or when getting up from crouching down (sounds like PPPD)  Workup:  - per report noncontrast head CT 05/11/2018 unremarkable  - MRI brain with without contrast 08/07/2018: Unremarkable brain MRI  - she reports she has had multiple maybe 3 MRI brains that have been unremarkable since injury    We have discussed concussions and the natural course of recovery  We have discussed that symptoms from a concussion typically take 2 weeks to resolve, and although sometimes it can feel like concussion symptoms linger on, at this point these symptoms would be related to contributing factors  - Contributing factors may include:  Prolonged removal from normal routine (was out of work for 4 months), preexisting chronic headaches or migraines, anxiety or depression, stress, lack of conditioning   - I have recommended gradual return of cognitive and physical activity  - We discussed that newer research regarding concussion shows that the sooner one returns gradually to their normal physical and cognitive routine, the sooner one tends to recover   Prolonged removal from normal routine and deconditioning have been shown to prolong symptoms    - We discussed that sometimes this constellation of symptoms is referred to as "post concussion syndrome," but I prefer not to use this term since that can be misleading and make people think they are still brain injured or "concussed," when the most common and likely etiology this far out from the head trauma is either contributing factors or a form of functional neurologic disorder with mixed symptoms, especially after a thorough workup to rule out other etiologies since concussion would not be the direct cause at this point    - We discussed how cognitive issues can have multiple causes and often related to multifactorial etiologies including stress, anxiety,  mood, pain, hypervigilance  and sleep issues and provided reassurance that, it is not likely the cognitive dysfunction is related to brain injury at this point    - Safe driving precautions -she should not drive at all if feeling sleepy or cognitively not well  Headache Preventive:  - Discussed headache hygiene and lifestyle factors that may improve headaches   - Discussed some headache preventative supplements that could be considered as below  - she follows with my neurology colleagues and is on long-acting topiramate which she was started on prior to the injury around 2017   - Discussed how cognitive behavioral therapy can help with many symptoms, recommended considering listening to the neuroscience of pain/discomfort lectures on [employer]      Headache Abortive:  - Discussed not taking over-the-counter or prescription headache abortive more than 3 days per week to prevent medication overuse headache  - she is on multiple medications prescribed by my colleagues including prochlorperazine which is helping, dexamethasone and Depakote have also helped in the past, rizatriptan 10 mg used to help for her migraines, but do not help for these headaches      Patient inctructions:   As we discussed the concussion itself is long over and symptoms that remain are due to contributing factors including posttraumatic stress    Curable - Download this free Heriberto on your phone (they will offer subscription, but you do not have to do this)  - I recommend listening to the first approximately 5 or so lectures (more if you want) that are about 10-20 minutes long on the neuroscience of pain  - They discuss how pain works in the brain and steps to try and cure chronic pain    I recommend CBT  For your chronic symptoms  - consider Dr Orquidea Ramos integrative behavioral health [phone]      Headache/migraine treatment:   Abortive medications (for immediate treatment of a headache): Ok to take ibuprofen or acetaminophen for headaches, but try to limit the amount and frequency that you are taking to avoid medication overuse/rebound headache  No change to current meds    Over the counter preventive supplements for headaches/migraines   (to take every day to help prevent headaches - not to take at the time of headache):  Consider adding one or more of these supplements - can get on Alonzo - or something called Mind ease  - Magnesium 400mg daily  - Can occasionally cause stomach upset - if so try at night, with food or stop, rarely can cause diarrhea if so stop  - Riboflavin (Vitamin B2) 400mg daily - FYI B2 may make your urine bright/neon yellow   AND/OR  - Herbal medication: Petasites/Butterbur 150 mg daily  (When choosing your Butterbur online, beware that there are some poor preps containing pyrrolizidine alkaloids (PAs) that can be harmful to the liver  Therefore, do not use butterbur products that are not certified and labeled as PA-free )    Prescription preventive medications for headaches/migraines   (to take every day to help prevent headaches - not to take at the time of headache): No change to current meds    Lifestyle Recommendations:  - Maintain good sleep hygiene  Going to bed and waking up at consistent times, avoiding excessive daytime naps, avoiding caffeinated beverages in the evening, avoid excessive stimulation in the evening and generally using bed primarily for sleeping  One hour before bedtime would recommend turning lights down lower, decreasing your activity (may read quietly, listen to music at a low volume)  When you get into bed, should eliminate all technology (no texting, emailing, playing with your phone, iPad or tablet in bed)  - Maintain good hydration  Drink  2L of fluid a day (4 typical small water bottles)  - Maintain good nutrition  In particular don't skip meals and eat balanced meals regularly  Education and Follow-up  - Please contact us if any questions or concerns arise   Of course, try to protect yourself from head injuries, and if any new concerning symptoms or significant blow to the head or body go to the emergency department  - Follow up with Dr Cortes De Leon as needed        CC:   Catrachita Durham is a 35y o  year old  right handed female who presents for evaluation following a possible concussion  History obtained from patient as well as available medical record review  This is a Concussion Case that may be under litigation  Patient understands that we will not be writing any letters or working with  on their behalf  However, we will continue to do our best to provide the best medical care possible  History of Present Illness:   Current medical illnesses or past medical history include vitamin-D deficiency, atypical facial pain, migraines, chronic tension headaches       Date/Specifics: On 04/28/2018, a piece of a large silo fell down and hit her on the top of the head  Acute symptoms included: no LOC, no amnesia, everything went blurry, she was pale, head pain, took ambulance out     Pt [facility] ED [facility]   - 13 staples in her head  - got up the next day and felt foggy  Followed up with PCP 4/30/18  - dx concussion    Noncontrast head CT 05/11/2018 unremarkable  MRI brain with without contrast 08/07/2018: Unremarkable brain MRI    She was initially evaluated by [facility] Dr Lucia Malik ([facility]), did OT, PT, Occular therapy   She was evaluated by physical therapy and has been discharged  She has been evaluated by ENT  She has been following with my neurology colleagues Dr Tamara Moncada and Ava Jimenes     Did vestibular therapy and it did not help     - as of 03/03/2020 she reports headaches, heaviness on had an fogginess, tingling right cheek, sensation changes where she was hit, feels her short-term memory is not what he used to be, photophobia, phonophobia, occasional lightheadedness in am or when getting up from crouching down    Work:  - out for 4 months and gradual return   - Special       Physical activity: trying to do yoga   Sleep: sleeping a lot, fatigue   Water: drinking a lot     Hobbies:  - used to: exercising at the gym, running, camping, outside activities, gardening   - not doing as much with kids, used to be so active      Lightheadedness:  Likely PPPD  Occasional in a m  Or when getting up from couch in down  Typically last a few seconds to a few minutes  Completed PT  Vestibular therapy made it worse  Evaluated by ENT who felt no peripheral cause      Headache - likely combination of migraines in functional neurologic disorder  Still has pain at the top of the head that feels sore  Pain going to the right side of the ear tingling in the right side of the face (she was told by her dentist that it is TMJ  she also has pain on the v2 distribution and it occurs with concentration   She states doing occular therapy and that may have made her symptoms worse )      Migraines 3 times a month  She follows with my neurology colleagues  Abortive:  Prochlorperazine 10 mg - working well  Rizatriptan-ineffective  Past:  Dexamethasone, Depakote, (Tizanidine (lethargy))    Preventive:    Topiramate extended release 100 mg (has been on for 3-4 years)  Past: Gabapentin 200 mg p o  Q h s  - made her feel funny                                            The following portions of the patient's history were reviewed in the system and updated as appropriate: allergies, current medications, past family history, past medical history, past social history, past surgical history and problem list       Past Medical History:       Past Medical History:   Diagnosis Date    Concussion 04/28/2018    Headache     Migraine     Neck pain     Post concussion syndrome      Patient Active Problem List   Diagnosis    Tension headache, chronic    Atypical facial pain    Migraine without aura and without status migrainosus, not intractable    Dizziness and giddiness    Vitamin D deficiency       Medications:      Current Outpatient Medications   Medication Sig Dispense Refill    cholecalciferol (VITAMIN D3) 1,000 units tablet Take 1,000 Units by mouth daily      Cyanocobalamin (VITAMIN B 12 PO) Take by mouth daily      fluticasone (FLONASE) 50 mcg/act nasal spray 2 sprays as needed   5    ibuprofen (MOTRIN) 200 mg tablet Take 400 mg by mouth once as needed for mild pain      prochlorperazine (COMPAZINE) 10 mg tablet Take 1 tablet (10 mg total) by mouth every 6 (six) hours as needed (migraine) 10 tablet 0    rizatriptan (MAXALT) 10 MG tablet Take 1 tablet (10 mg total) by mouth as needed for migraine May repeat in 2 hours if needed  Limit 3 a week or 9 a month 9 tablet 3    TROKENDI  MG CP24 Take 1 capsule (100 mg total) by mouth daily 90 capsule 3    dexamethasone (DECADRON) 1 mg tablet Take 1 tablet (1 mg total) by mouth daily with breakfast (Patient not taking: Reported on 3/3/2020) 5 tablet 0    divalproex sodium (DEPAKOTE ER) 250 mg 24 hr tablet Take 1 tablet (250 mg total) by mouth daily at bedtime 2 tablets at bedtime for 3 days then 1 tablet at bedtime for 2 days (Patient not taking: Reported on 3/3/2020) 8 tablet 0     No current facility-administered medications for this visit           Allergies:    No Known Allergies    Family History:        Family History   Problem Relation Age of Onset   Mesfin Walker Cancer Mother     ALS Father     Cancer Maternal Grandmother          Social History:       Social History     Socioeconomic History    Marital status: /Civil Union     Spouse name: Not on file    Number of children: Not on file    Years of education: Not on file    Highest education level: Not on file   Occupational History    Not on file   Social Needs    Financial resource strain: Not on file    Food insecurity:     Worry: Not on file     Inability: Not on file    Transportation needs:     Medical: Not on file     Non-medical: Not on file   Tobacco Use    Smoking status: Never Smoker    Smokeless tobacco: Never Used   Substance and Sexual Activity    Alcohol use: Yes     Comment: socially     Drug use: No    Sexual activity: Not on file   Lifestyle    Physical activity:     Days per week: Not on file     Minutes per session: Not on file    Stress: Not on file   Relationships    Social connections:     Talks on phone: Not on file     Gets together: Not on file     Attends Zoroastrianism service: Not on file     Active member of club or organization: Not on file     Attends meetings of clubs or organizations: Not on file     Relationship status: Not on file    Intimate partner violence:     Fear of current or ex partner: Not on file     Emotionally abused: Not on file     Physically abused: Not on file     Forced sexual activity: Not on file   Other Topics Concern    Not on file   Social History Narrative    Not on file       Objective:     Physical Exam:                                                                 Vitals:            Constitutional:    /75 (BP Location: Right arm, Patient Position: Sitting, Cuff Size: Adult)   Pulse 76   Resp 16   Ht 5' 3" (1 6 m)   Wt 64 9 kg (143 lb)   BMI 25 33 kg/m²    BP Readings from Last 3 Encounters:   03/03/20 114/75   12/27/19 103/61   08/20/19 108/70     Pulse Readings from Last 3 Encounters:   03/03/20 76   12/27/19 69   08/20/19 70         Well developed, well nourished, well groomed  No dysmorphic features  HEENT:  Normocephalic atraumatic  No meningismus  Oropharynx is clear and moist  No oral mucosal lesions  Chest:  Respirations regular and unlabored  Cardiovascular:  Regular rate, intact distal pulses  Distal extremities warm without palpable edema or tenderness, no observed significant swelling  Musculoskeletal:  Full range of motion  (see below under neurologic exam for evaluation of motor function and gait)   Skin:  warm and dry, not diaphoretic  No apparent birthmarks or stigmata of neurocutaneous disease     Psychiatric:  Normal behavior and appropriate affect Neurological Examination:     Mental status/cognitive function:   Orientated to time, place and person  Recent and remote memory intact  Attention span and concentration as well as fund of knowledge are appropriate for age  Normal language and spontaneous speech  Cranial Nerves:  III, IV, VI-Pupils were equal, round, and reactive to light  Extraocular movements were full and conjugate without nystagmus  conjugate gaze, normal smooth pursuits, normal saccades   VII-facial expression symmetric, intact forehead wrinkle, strong eye closure, symmetric smile    VIII-hearing grossly intact bilaterally   Motor Exam: symmetric bulk throughout  no atrophy, fasciculations or abnormal movements noted  Coordination:  no apparent dysmetria, ataxia or tremor noted  Gait: steady casual gait     Pertinent lab results:   No routine labs in our system  06/19/2019 vitamin-D 16 5  B12 405  TSH 1 296    EKG 12/27/2019:  sinus bradycardia, rate 55,       Imaging:   I have personally reviewed imaging and radiology read     - per report noncontrast head CT 05/11/2018 unremarkable  - MRI brain with without contrast 08/07/2018: Unremarkable brain MRI  - has had 2-3 since hitting head      Review of Systems:   ROS obtained by medical assistant Personally reviewed and updated if indicated  Review of Systems   Constitutional: Negative  Negative for appetite change and fever  HENT: Negative  Negative for hearing loss, tinnitus, trouble swallowing and voice change  Eyes: Negative  Negative for photophobia and pain  Respiratory: Negative  Negative for shortness of breath  Cardiovascular: Negative  Negative for palpitations  Gastrointestinal: Negative  Negative for nausea and vomiting  Endocrine: Negative  Negative for cold intolerance and heat intolerance  Genitourinary: Negative  Negative for dysuria, frequency and urgency  Musculoskeletal: Negative  Negative for myalgias and neck pain  Skin: Negative  Negative for rash  Allergic/Immunologic: Negative  Neurological: Negative  Negative for dizziness, tremors, seizures, syncope, facial asymmetry, speech difficulty, weakness, light-headedness, numbness (tingling feeling on right side cheek) and headaches  Heaviness on head and fogginess   Hematological: Negative  Does not bruise/bleed easily  Psychiatric/Behavioral: Negative  Negative for confusion, hallucinations and sleep disturbance  I have spent 45 minutes with Patient  today in which greater than 50% of this time was spent in counseling/coordination of care regarding Diagnostic results, Prognosis, Risks and benefits of tx options, Intructions for management, Patient education, Risk factor reductions and Impressions        Author:  Israel Dorsey MD   Fellowship trained Concussion Specialist

## 2020-03-03 NOTE — PATIENT INSTRUCTIONS
As we discussed the concussion itself is long over and symptoms that remain are due to contributing factors including posttraumatic stress    [employer] - Download this free Heriberto on your phone (they will offer subscription, but you do not have to do this)  - I recommend listening to the first approximately 5 or so lectures (more if you want) that are about 10-20 minutes long on the neuroscience of pain  - They discuss how pain works in the brain and steps to try and cure chronic pain    I recommend CBT  For your chronic symptoms  - consider Dr Lennox Marcus [facility] behavioral health [phone]      Headache/migraine treatment:   Abortive medications (for immediate treatment of a headache): Ok to take ibuprofen or acetaminophen for headaches, but try to limit the amount and frequency that you are taking to avoid medication overuse/rebound headache  No change to current meds    Over the counter preventive supplements for headaches/migraines   (to take every day to help prevent headaches - not to take at the time of headache):  Consider adding one or more of these supplements - can get on Alonzo - or something called Mind ease  - Magnesium 400mg daily  - Can occasionally cause stomach upset - if so try at night, with food or stop, rarely can cause diarrhea if so stop  - Riboflavin (Vitamin B2) 400mg daily - FYI B2 may make your urine bright/neon yellow   AND/OR  - Herbal medication: Petasites/Butterbur 150 mg daily  (When choosing your Butterbur online, beware that there are some poor preps containing pyrrolizidine alkaloids (PAs) that can be harmful to the liver  Therefore, do not use butterbur products that are not certified and labeled as PA-free )    Prescription preventive medications for headaches/migraines   (to take every day to help prevent headaches - not to take at the time of headache): No change to current meds    Lifestyle Recommendations:  - Maintain good sleep hygiene    Going to bed and waking up at consistent times, avoiding excessive daytime naps, avoiding caffeinated beverages in the evening, avoid excessive stimulation in the evening and generally using bed primarily for sleeping  One hour before bedtime would recommend turning lights down lower, decreasing your activity (may read quietly, listen to music at a low volume)  When you get into bed, should eliminate all technology (no texting, emailing, playing with your phone, iPad or tablet in bed)  - Maintain good hydration  Drink  2L of fluid a day (4 typical small water bottles)  - Maintain good nutrition  In particular don't skip meals and eat balanced meals regularly  Education and Follow-up  - Please contact us if any questions or concerns arise  Of course, try to protect yourself from head injuries, and if any new concerning symptoms or significant blow to the head or body go to the emergency department    - Follow up with Dr Mariana Muñoz as needed

## 2020-03-03 NOTE — PROGRESS NOTES
Review of Systems   Constitutional: Negative  Negative for appetite change and fever  HENT: Negative  Negative for hearing loss, tinnitus, trouble swallowing and voice change  Eyes: Negative  Negative for photophobia and pain  Respiratory: Negative  Negative for shortness of breath  Cardiovascular: Negative  Negative for palpitations  Gastrointestinal: Negative  Negative for nausea and vomiting  Endocrine: Negative  Negative for cold intolerance and heat intolerance  Genitourinary: Negative  Negative for dysuria, frequency and urgency  Musculoskeletal: Negative  Negative for myalgias and neck pain  Skin: Negative  Negative for rash  Allergic/Immunologic: Negative  Neurological: Negative  Negative for dizziness, tremors, seizures, syncope, facial asymmetry, speech difficulty, weakness, light-headedness, numbness (tingling feeling on right side cheek) and headaches  Heaviness on head and fogginess   Hematological: Negative  Does not bruise/bleed easily  Psychiatric/Behavioral: Negative  Negative for confusion, hallucinations and sleep disturbance

## 2020-03-04 ENCOUNTER — TELEPHONE (OUTPATIENT)
Dept: NEUROLOGY | Facility: CLINIC | Age: 40
End: 2020-03-04

## 2020-03-04 NOTE — TELEPHONE ENCOUNTER
Received request for records from [facility]    Scanned copy of request into media, attached to this encounter and faxed to [facility]

## 2020-04-03 ENCOUNTER — TELEPHONE (OUTPATIENT)
Dept: NEUROLOGY | Facility: CLINIC | Age: 40
End: 2020-04-03

## 2020-04-14 ENCOUNTER — TELEMEDICINE (OUTPATIENT)
Dept: NEUROLOGY | Facility: CLINIC | Age: 40
End: 2020-04-14
Payer: COMMERCIAL

## 2020-04-14 DIAGNOSIS — G43.009 MIGRAINE WITHOUT AURA AND WITHOUT STATUS MIGRAINOSUS, NOT INTRACTABLE: Primary | ICD-10-CM

## 2020-04-14 PROCEDURE — 99214 OFFICE O/P EST MOD 30 MIN: CPT | Performed by: PHYSICIAN ASSISTANT

## 2020-06-26 ENCOUNTER — TELEPHONE (OUTPATIENT)
Dept: NEUROLOGY | Facility: CLINIC | Age: 40
End: 2020-06-26

## 2020-07-29 ENCOUNTER — TELEMEDICINE (OUTPATIENT)
Dept: NEUROLOGY | Facility: CLINIC | Age: 40
End: 2020-07-29
Payer: COMMERCIAL

## 2020-07-29 VITALS — HEIGHT: 63 IN | BODY MASS INDEX: 24.8 KG/M2 | WEIGHT: 140 LBS

## 2020-07-29 DIAGNOSIS — G43.009 MIGRAINE WITHOUT AURA AND WITHOUT STATUS MIGRAINOSUS, NOT INTRACTABLE: Primary | ICD-10-CM

## 2020-07-29 PROCEDURE — 99213 OFFICE O/P EST LOW 20 MIN: CPT | Performed by: PHYSICIAN ASSISTANT

## 2020-07-29 NOTE — PROGRESS NOTES
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 Less then 5 minutes to seconds  Associated: blurry vision  Treatment done:  PT completed and PT on her own at home    Following ENT at this time     Cervicalgia/occipital neck pain  How often does it occur? Once a week  How long does it last? 10 minutes  What time of the day does it occur? Middle of the night  Describe ear pain:  Sharp shooting  Associated: right occipital pain     Right facial numbness/pain and arm numbness:   - started after head injury in 2018   - she was told by her dentist that it is TMJ  she also has pain on the v2 distribution and it occurs with concentration  She states doing occular therapy and that may have made her symptoms worse       Migraine headache/Tension therapy:   What medications do you take or have you taken for your headaches? PREVENTIVE:    - Topamax extended release, Gabapentin 200 mg at bedtime,   - Tizanidine (lethargy)  ABORTIVE:    - Naproxen,   - Maxalt,  - Prochlorperazine  - Decadron     Aura and how long does it last - blurry vision with migraine - last only a few minutes     What is your current pain level - 0/10; was a 2-3/10     How often do the headaches occur ?   Sharp and shooting headaches: 2 a month  Migraine headache - 0-1 times a month       What time of the day do the headaches start -   Sharp and shooting headaches: anytime of the day  Migraine headache : anytime of the day     How long do the headaches last -   Sharp and shooting headaches: 1-2 hours and without medication all day or can be 2 days if not effective  Migraine headache - 2 hours to 2 days     Where are they located -   Sharp and shooting headaches: right orbital pain on the lateral aspect  Migraine headache - band-like over the forehead and back of the head,      What is the intensity of pain -   Sharp and shooting pain: 5-7/10  Migraine headaches: 7-8/10     Are you ever headache free - Yes      Describe your usual headache -   Sharp and shooting pain: like an ice-cream headache, stabbing  Migraine headaches:  throbbing and pounding     Associated symptoms:   · Photophobia, phonophobia  · Problem with concentration  · Blurred vision  · Lacrimation  · Tinnitus, light-headed  · Right hand tingles - intermittently  · Prefer to be alone and in a dark room     Headache are worse if the patient: bending over, running or exercising     Headache trigger: none      Have you used CBD or THC for your headaches and how often? No  How much caffeine to you consume per day? One cup of coffee per day  Non-Medical/Alternative Treatments used in the past for headaches: None     08/07/2018 MRI head with and without contrast:  No acute disease      I personally reviewed these images      Reviewed old notes from physician seen in the past- see above HPI for summary of previous encounters      Current medical illnesses:  QTc  History Tobacco use:    What medications do you take or have you taken for your headaches? Preventive:   ***  Abortive:   ***   Alternative therapies used in the past for headaches? ***  Headache are worse if the patient: cough, sneeze, bending over, Exertion  Headache triggers:  ***    Aura/warning and how long does it last ? {YES/NO:05137} *** when does it start?   How long does it last before pain? Continue through pain? What is your current pain level ? ***    How often do the headaches occur? ***  Are you ever headache free? {YES/NO:15924}    What time of the day do the headaches start? ***    How long do the headaches last?   ***    Describe your usual headache ? Throbbing, pounding, pressure, squeezing, dull, nagging, ice-pick like, stabbing, sharp, shooting, electric, tight band    Where is your headache located? ***    What is the intensity of pain? ***    Associated symptoms:   - Decrease of appetite, nausea, vomiting, diarrhea  - Photophobia, phonophobia, sensitivity to smell   - Problem with concentration  - Blurred vision, change in pupil size, Ptosis, facial droop  - Red ear   - Lacrimation, runny or stuffed-up nose, flushing of face  - Tinnitus, light-headed or dizzy, stiff or sore neck,   - Hands or feet tingle or feel numb, prefer to be alone and in a dark room, unable to work    Number of days missed per month because of headaches:  Work (or school) days: ***  Social or Family activities: ***    What time of the year do headaches occur more frequently? {Headache time related:71950}  Have you seen someone else for headaches or pain? {YES /AU:46885}  Have you had trigger point injection performed and how often? {YES /HU:71900}  Have you had Botox injection performed and how often? {YES /NQ:03549}   Have you had epidural injections or transforaminal injections performed? {YES /EK:12102}    Have you used CBD or THC for your headaches and how often? {YES I0436672    Are you current pregnant or planning on getting pregnant? {YES /CX:05516}    Have you ever had any Brain imaging? {Yes     NB:45166} {reviewed images:15374}    Reviewed old notes from physician seen in the past- see above HPI for summary of previous encounters       Past Medical History:   Diagnosis Date    Concussion 04/28/2018    Headache     Migraine     Neck pain        Patient Active Problem List   Diagnosis    Tension headache, chronic    Atypical facial pain    Migraine without aura and without status migrainosus, not intractable    Dizziness and giddiness    Vitamin D deficiency    Persistent postural-perceptual dizziness    Functional neurological symptom disorder with mixed symptoms       Medications:      Current Outpatient Medications   Medication Sig Dispense Refill    cholecalciferol (VITAMIN D3) 1,000 units tablet Take 1,000 Units by mouth daily      Cyanocobalamin (VITAMIN B 12 PO) Take by mouth daily      fluticasone (FLONASE) 50 mcg/act nasal spray 2 sprays as needed   5    ibuprofen (MOTRIN) 200 mg tablet Take 400 mg by mouth once as needed for mild pain      prochlorperazine (COMPAZINE) 10 mg tablet Take 1 tablet (10 mg total) by mouth every 6 (six) hours as needed (migraine) 10 tablet 0    rizatriptan (MAXALT) 10 MG tablet Take 1 tablet (10 mg total) by mouth as needed for migraine May repeat in 2 hours if needed  Limit 3 a week or 9 a month 9 tablet 3    TROKENDI  MG CP24 Take 1 capsule (100 mg total) by mouth daily 90 capsule 3    Ubrogepant (Ubrelvy) 50 MG TABS Take 50 mg by mouth as needed (migraine) May repeat in 2 hours if needed  Limit of 200 mg in 24 hours 10 tablet 3     No current facility-administered medications for this visit           Allergies:    No Known Allergies    Family History:     Family History   Problem Relation Age of Onset    Cancer Mother     ALS Father     Cancer Maternal Grandmother        Social History:     Social History     Socioeconomic History    Marital status: /Civil Union     Spouse name: Not on file    Number of children: Not on file    Years of education: Not on file    Highest education level: Not on file   Occupational History    Not on file   Social Needs    Financial resource strain: Not on file    Food insecurity:     Worry: Not on file     Inability: Not on file    Transportation needs: Medical: Not on file     Non-medical: Not on file   Tobacco Use    Smoking status: Never Smoker    Smokeless tobacco: Never Used   Substance and Sexual Activity    Alcohol use: Yes     Comment: socially     Drug use: No    Sexual activity: Not on file   Lifestyle    Physical activity:     Days per week: Not on file     Minutes per session: Not on file    Stress: Not on file   Relationships    Social connections:     Talks on phone: Not on file     Gets together: Not on file     Attends Worship service: Not on file     Active member of club or organization: Not on file     Attends meetings of clubs or organizations: Not on file     Relationship status: Not on file    Intimate partner violence:     Fear of current or ex partner: Not on file     Emotionally abused: Not on file     Physically abused: Not on file     Forced sexual activity: Not on file   Other Topics Concern    Not on file   Social History Narrative    Not on file         Objective:   Physical Exam:                                                                   Vitals: There were no vitals taken for this visit    BP Readings from Last 3 Encounters:   03/03/20 114/75   12/27/19 103/61   08/20/19 108/70     Pulse Readings from Last 3 Encounters:   03/03/20 76   12/27/19 69   08/20/19 70                 Review of Systems:   Review of Systems    I personally reviewed the ROS entered by the MA      Author:  Karen Lo PA-C 7/29/2020 8:26 AM

## 2020-07-29 NOTE — ASSESSMENT & PLAN NOTE
Migraine headaches:  Continue taking trokendi xr 100mg at this time  At onset of migraine or Aura take Ubrelvy 50 mg  May repeat in 2 hours if needed  Limit of 200 mg in 24 hours    If you are needing to repeat the medication over 50% of the time or are not satisfied with the results, call for 100 mg tablets  May also use prochlorperazine at onset or with the [place]

## 2020-07-29 NOTE — PATIENT INSTRUCTIONS
Migraine headaches:  Continue taking trokendi xr 100mg at this time  At onset of migraine or Aura take Ubrelvy 50 mg  May repeat in 2 hours if needed  Limit of 200 mg in 24 hours  If you are needing to repeat the medication over 50% of the time or are not satisfied with the results, call for 100 mg tablets  May also use prochlorperazine at onset or with the ubrelvy    Neck pain:   - We discussed the role of neck pathology and poor posture, with straightening of the normal cervical lordosis, in headaches  We discussed how tightening of the neck muscles can irritate the nerves in the occipital region of her head and cause or worsen head pain  We also discussed and demonstrated neck strengthening and relaxation exercises, as well as giving written instructions on these exercises  - We talked about the importance of good posture for improving shoulder, neck, and head pain  The patient was given visualization exercises for correcting posture, which patient will practice at home  If this simple exercise does not help improve the posture, we will consider formal physical therapy in the future  Medication overuse headaches:   - We discussed medication overuse headache [facility]) and how to avoid it in the future  It was explained that all analgesics have the potential to cause medication overuse headache [facility]) and analgesic overuse can negate the effectiveness of headache preventive measures  After successful [address] treatment, preventive medications for an underlying primary headache disorder have a greater chance for success  Avoid medications with narcotics, barbiturates, or caffeine in them as these can cause rebound headaches after very few doses and can interfere with other headache medicine efficacy  Taking any analgesics for more than 2-3 days a week can cause medication overuse headache  Reproductive age women: Should take folic acid daily when taking anti-seizure drugs especially Depakote  [place] Prescription Drug Monitoring Program report was reviewed and was appropriate      Headache management instructions  - When patient has a moderate to severe headache, they should seek rest, initiate relaxation and apply cold compresses to the head  - Maintain regular sleep schedule  Adults need at least 7-8 hours of uninterrupted a night  - Limit over the counter medications such as Tylenol, Ibuprofen, Aleve, Excedrin  (No more than 3 times a week)  - Maintain headache diary  We discussed an ROHIT for a smart phone is "Migraine fernando"  - Limit caffeine to 1-2 cups 8 to 16 oz a day or less  - Avoid dietary trigger  (aged cheese, peanuts, MSG, aspartame and nitrates)  - Patient is to have regular frequent meals to prevent headache onset  - Please drink at least 64 ounces of water a day to help remain hydrated  Please call with any questions or concerns   Office number is [phone]

## 2020-07-29 NOTE — PROGRESS NOTES
Virtual Regular Visit      Assessment/Plan:    Problem List Items Addressed This Visit        Cardiovascular and Mediastinum    Migraine without aura and without status migrainosus, not intractable - Primary     Migraine headaches:  Continue taking trokendi xr 100mg at this time  At onset of migraine or Aura take Ubrelvy 50 mg  May repeat in 2 hours if needed  Limit of 200 mg in 24 hours  If you are needing to repeat the medication over 50% of the time or are not satisfied with the results, call for 100 mg tablets  May also use prochlorperazine at onset or with the Lonni Starling                    Reason for visit is   Chief Complaint   Patient presents with    Migraine    Virtual Regular Visit        Encounter provider Liz Horowitz PA-C    Provider located at [address]      Recent Visits  No visits were found meeting these conditions  Showing recent visits within past 7 days and meeting all other requirements     Today's Visits  Date Type Provider Dept   07/29/20 Telemedicine Liz Horowitz PA-C [facility]   Showing today's visits and meeting all other requirements     Future Appointments  No visits were found meeting these conditions  Showing future appointments within next 150 days and meeting all other requirements        The patient was identified by name and date of birth  Que Neumann was informed that this is a telemedicine visit and that the visit is being conducted through telephone  My office door was closed  No one else was in the room  She acknowledged consent and understanding of privacy and security of the video platform  The patient has agreed to participate and understands they can discontinue the visit at any time    It was my intent to perform this visit via video technology but the patient was not able to do a video connection so the visit was completed via audio telephone only  Patient is aware this is a billable service  Subjective  Kelsey Donahue is a 35 y o  female 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 Less then 5 minutes to seconds  Associated: blurry vision  Treatment done:  PT completed and PT on her own at home    Following ENT at this time     Cervicalgia/occipital neck pain  How often does it occur? Once a week  How long does it last? 10 minutes  What time of the day does it occur? Middle of the night  Describe ear pain:  Sharp shooting  Associated: right occipital pain     Right facial numbness/pain and arm numbness:   - started after head injury in march of 2018   - she was told by her dentist that it is TMJ  she also has pain on the v2 distribution and it occurs with concentration  She states doing occular therapy and that may have made her symptoms worse       Migraine headache/Tension therapy:   What medications do you take or have you taken for your headaches? PREVENTIVE:    - Topamax extended release, Gabapentin 200 mg at bedtime,   - Tizanidine (lethargy)  ABORTIVE:    - Naproxen, ibuprofen  - Maxalt,  - Aliza Fam  - Prochlorperazine  - Decadron     Aura and how long does it last - blurry vision with migraine - last only a few minutes     What is your current pain level - 0/10     How often do the headaches occur ?   Sharp and shooting headaches: 2 a month  Migraine headache - 0-1 times a month       What time of the day do the headaches start -   Sharp and shooting headaches: anytime of the day  Migraine headache : anytime of the day     How long do the headaches last -   Sharp and shooting headaches: 1-2 hours and without medication all day or can be 2 days if not effective (less time with the Ubrelvy)  Migraine headache - 2 hours to 2 days (less time with the Dionisio Martinez)     Where are they located -   Sharp and shooting headaches: right orbital pain on the lateral aspect  Migraine headache - band-like over the forehead and back of the head,      What is the intensity of pain -   Sharp and shooting pain: 5-7/10  Migraine headaches: 7-8/10     Are you ever headache free - Yes      Describe your usual headache -   Sharp and shooting pain: like an ice-cream headache, stabbing  Migraine headaches:  throbbing and pounding     Associated symptoms:   · Photophobia, phonophobia  · Problem with concentration  · Blurred vision  · Lacrimation  · Tinnitus, light-headed  · Right hand tingles - intermittently  · Prefer to be alone and in a dark room     Headache are worse if the patient: bending over, running or exercising     Headache trigger: none      Have you used CBD or THC for your headaches and how often? No  How much caffeine to you consume per day? One cup of coffee per day  Non-Medical/Alternative Treatments used in the past for headaches: None     08/07/2018 MRI head with and without contrast:  No acute disease      I personally reviewed these images      Reviewed old notes from physician seen in the past- see above HPI for summary of previous encounters       Past Medical History:   Diagnosis Date    Concussion 04/28/2018    Headache     Migraine     Neck pain        Past Surgical History:   Procedure Laterality Date    NO PAST SURGERIES         Current Outpatient Medications   Medication Sig Dispense Refill    cholecalciferol (VITAMIN D3) 1,000 units tablet Take 1,000 Units by mouth daily      Cyanocobalamin (VITAMIN B 12 PO) Take by mouth daily      fluticasone (FLONASE) 50 mcg/act nasal spray 2 sprays as needed   5    ibuprofen (MOTRIN) 200 mg tablet Take 400 mg by mouth once as needed for mild pain      prochlorperazine (COMPAZINE) 10 mg tablet Take 1 tablet (10 mg total) by mouth every 6 (six) hours as needed (migraine) 10 tablet 0    TROKENDI  MG CP24 Take 1 capsule (100 mg total) by mouth daily 90 capsule 3    Ubrogepant (Ubrelvy) 50 MG TABS Take 50 mg by mouth as needed (migraine) May repeat in 2 hours if needed  Limit of 200 mg in 24 hours 10 tablet 3     No current facility-administered medications for this visit  No Known Allergies   I have reviewed the patient's medical, social and surgical history as well as medications in detail and updated the computerized patient record  Review of Systems  Constitutional: Negative  HENT: Negative  Eyes: Negative  Respiratory: Negative  Cardiovascular: Negative  Gastrointestinal: Negative  Endocrine: Negative  Genitourinary: Negative  Musculoskeletal: Negative  Skin: Negative  Allergic/Immunologic: Negative  Neurological: Positive for dizziness (at times (decrease) ) and headaches (average 2 headaches a month )  Hematological: Negative  Psychiatric/Behavioral: Negative  Video Exam    Vitals:    07/29/20 1500   Weight: 63 5 kg (140 lb)   Height: 5' 3" (1 6 m)       Physical Exam   Unable to perform  I spent 15 minutes with patient today in which greater than 50% of the time was spent in counseling/coordination of care regarding as above      VIRTUAL VISIT DISCLAIMER    Daraibis Brunner acknowledges that she has consented to an online visit or consultation  She understands that the online visit is based solely on information provided by her, and that, in the absence of a face-to-face physical evaluation by the physician, the diagnosis she receives is both limited and provisional in terms of accuracy and completeness  This is not intended to replace a full medical face-to-face evaluation by the physician  Bettey Brunner understands and accepts these terms

## 2020-07-29 NOTE — PROGRESS NOTES
Review of Systems   Constitutional: Negative  HENT: Negative  Eyes: Negative  Respiratory: Negative  Cardiovascular: Negative  Gastrointestinal: Negative  Endocrine: Negative  Genitourinary: Negative  Musculoskeletal: Negative  Skin: Negative  Allergic/Immunologic: Negative  Neurological: Positive for dizziness (at times (decrease) ) and headaches (average 2 headaches a month )  Hematological: Negative  Psychiatric/Behavioral: Negative

## 2020-08-24 NOTE — TELEPHONE ENCOUNTER
She would need to see urology re: frequent urination  Referral was placed  Siva Marino,  Patient requesting refill of prochlorperazine  Last script 1/24/20  I have queued this up for you  Please sign if you are agreeable to this      TY

## 2020-11-16 ENCOUNTER — TELEPHONE (OUTPATIENT)
Dept: NEUROLOGY | Facility: CLINIC | Age: 40
End: 2020-11-16

## 2020-11-30 ENCOUNTER — TELEPHONE (OUTPATIENT)
Dept: NEUROLOGY | Facility: CLINIC | Age: 40
End: 2020-11-30

## 2020-11-30 ENCOUNTER — TELEMEDICINE (OUTPATIENT)
Dept: NEUROLOGY | Facility: CLINIC | Age: 40
End: 2020-11-30
Payer: COMMERCIAL

## 2020-11-30 DIAGNOSIS — Z87.820 H/O CONCUSSION: ICD-10-CM

## 2020-11-30 DIAGNOSIS — G43.829 MENSTRUAL MIGRAINE WITHOUT STATUS MIGRAINOSUS, NOT INTRACTABLE: ICD-10-CM

## 2020-11-30 DIAGNOSIS — G43.009 MIGRAINE WITHOUT AURA AND WITHOUT STATUS MIGRAINOSUS, NOT INTRACTABLE: Primary | ICD-10-CM

## 2020-11-30 PROCEDURE — 99442 PR PHYS/QHP TELEPHONE EVALUATION 11-20 MIN: CPT | Performed by: PHYSICIAN ASSISTANT

## 2021-01-07 DIAGNOSIS — G43.009 MIGRAINE WITHOUT AURA AND WITHOUT STATUS MIGRAINOSUS, NOT INTRACTABLE: ICD-10-CM

## 2021-01-07 DIAGNOSIS — G50.1 ATYPICAL FACIAL PAIN: ICD-10-CM

## 2021-01-08 RX ORDER — TOPIRAMATE 100 MG/1
1 CAPSULE, EXTENDED RELEASE ORAL DAILY
Qty: 90 CAPSULE | Refills: 3 | Status: SHIPPED | OUTPATIENT
Start: 2021-01-08 | End: 2022-01-11

## 2021-03-08 ENCOUNTER — TELEPHONE (OUTPATIENT)
Dept: NEUROLOGY | Facility: CLINIC | Age: 41
End: 2021-03-08

## 2021-03-08 NOTE — TELEPHONE ENCOUNTER
pt called asking if she can get the COVID vaccine and if any of her medications would counter act or anything and if there is a particular COVID vaccine that is recommened for her    please advise  [phone] to leave detailed message

## 2021-04-06 DIAGNOSIS — G43.009 MIGRAINE WITHOUT AURA AND WITHOUT STATUS MIGRAINOSUS, NOT INTRACTABLE: ICD-10-CM

## 2021-08-09 ENCOUNTER — OFFICE VISIT (OUTPATIENT)
Dept: NEUROLOGY | Facility: CLINIC | Age: 41
End: 2021-08-09
Payer: COMMERCIAL

## 2021-08-09 VITALS
HEIGHT: 63 IN | DIASTOLIC BLOOD PRESSURE: 59 MMHG | HEART RATE: 64 BPM | TEMPERATURE: 98 F | BODY MASS INDEX: 28.49 KG/M2 | WEIGHT: 160.8 LBS | SYSTOLIC BLOOD PRESSURE: 107 MMHG

## 2021-08-09 DIAGNOSIS — G43.009 MIGRAINE WITHOUT AURA AND WITHOUT STATUS MIGRAINOSUS, NOT INTRACTABLE: Primary | ICD-10-CM

## 2021-08-09 DIAGNOSIS — G43.829 MENSTRUAL MIGRAINE WITHOUT STATUS MIGRAINOSUS, NOT INTRACTABLE: ICD-10-CM

## 2021-08-09 DIAGNOSIS — E55.9 VITAMIN D DEFICIENCY: ICD-10-CM

## 2021-08-09 PROCEDURE — 99214 OFFICE O/P EST MOD 30 MIN: CPT | Performed by: PHYSICIAN ASSISTANT

## 2021-08-09 NOTE — ASSESSMENT & PLAN NOTE
Continue taking trokendi xr 100mg at this time  At onset of migraine or Aura take Ubrelvy 50 mg  May repeat in 2 hours if needed  Limit of 200 mg in 24 hours    If you are needing to repeat the medication over 50% of the time or are not satisfied with the results, call for 100 mg tablets  May also use prochlorperazine at onset or with the [place]

## 2021-08-09 NOTE — PATIENT INSTRUCTIONS
Migraine headaches:  Continue taking trokendi xr 100mg at this time  At onset of migraine or Aura take Ubrelvy 50 mg  May repeat in 2 hours if needed  Limit of 200 mg in 24 hours  If you are needing to repeat the medication over 50% of the time or are not satisfied with the results, call for 100 mg tablets  May also use prochlorperazine at onset or with the ubrelvy    Neck pain:   - We discussed the role of neck pathology and poor posture, with straightening of the normal cervical lordosis, in headaches  We discussed how tightening of the neck muscles can irritate the nerves in the occipital region of her head and cause or worsen head pain  We also discussed and demonstrated neck strengthening and relaxation exercises, as well as giving written instructions on these exercises  - We talked about the importance of good posture for improving shoulder, neck, and head pain  The patient was given visualization exercises for correcting posture, which patient will practice at home  If this simple exercise does not help improve the posture, we will consider formal physical therapy in the future  Medication overuse headaches:   - We discussed medication overuse headache [facility]) and how to avoid it in the future  It was explained that all analgesics have the potential to cause medication overuse headache [facility]) and analgesic overuse can negate the effectiveness of headache preventive measures  After successful [address] treatment, preventive medications for an underlying primary headache disorder have a greater chance for success  Avoid medications with narcotics, barbiturates, or caffeine in them as these can cause rebound headaches after very few doses and can interfere with other headache medicine efficacy  Taking any analgesics for more than 2-3 days a week can cause medication overuse headache  Reproductive age women: Should take folic acid daily when taking anti-seizure drugs especially Depakote  [place] Prescription Drug Monitoring Program report was reviewed and was appropriate      Headache management instructions  - When patient has a moderate to severe headache, they should seek rest, initiate relaxation and apply cold compresses to the head  - Maintain regular sleep schedule  Adults need at least 7-8 hours of uninterrupted a night  - Limit over the counter medications such as Tylenol, Ibuprofen, Aleve, Excedrin  (No more than 3 times a week)  - Maintain headache diary  We discussed an ROHIT for a smart phone is "Migraine fernando"  - Limit caffeine to 1-2 cups 8 to 16 oz a day or less  - Avoid dietary trigger  (aged cheese, peanuts, MSG, aspartame and nitrates)  - Patient is to have regular frequent meals to prevent headache onset  - Please drink at least 64 ounces of water a day to help remain hydrated  Please call with any questions or concerns   Office number is [phone]

## 2021-08-09 NOTE — PROGRESS NOTES
[address] [facility]  PATIENT:  Alessandro Coleman  MRN:  [id number]  :  1980  DATE OF SERVICE:  2021      Assessment/Plan:     Migraine without aura and without status migrainosus, not intractable  Continue taking trokendi xr 100mg at this time  At onset of migraine or Aura take Ubrelvy 50 mg  May repeat in 2 hours if needed  Limit of 200 mg in 24 hours  If you are needing to repeat the medication over 50% of the time or are not satisfied with the results, call for 100 mg tablets  May also use prochlorperazine at onset or with the ubrelvy         Problem List Items Addressed This Visit        Cardiovascular and Mediastinum    Migraine without aura and without status migrainosus, not intractable - Primary     Continue taking trokendi xr 100mg at this time  At onset of migraine or Aura take Ubrelvy 50 mg  May repeat in 2 hours if needed  Limit of 200 mg in 24 hours  If you are needing to repeat the medication over 50% of the time or are not satisfied with the results, call for 100 mg tablets  May also use prochlorperazine at onset or with the ubrelvy         Menstrual migraine without status migrainosus, not intractable       Other    Vitamin D deficiency              History of Present Illness: We had the pleasure of evaluating Alessandro Coleman in neurological follow up  today for headaches  As you know,  she is a 38 y o   right handed female  She is a  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 Less then 5 minutes to seconds  Associated: blurry vision  Treatment done:  PT completed and PT on her own at home    Following ENT at this time     Cervicalgia/occipital neck pain  How often does it occur?  Once a week  How long does it last? 10 minutes  What time of the day does it occur? Middle of the night  Describe ear pain:  Sharp shooting  Associated: right occipital pain     Right facial numbness/pain and arm numbness:   - started after head injury in march of 2018   - she was told by her dentist that it is TMJ  she also has pain on the v2 distribution and it occurs with concentration  She states doing occular therapy and that may have made her symptoms worse       Migraine headache/Tension therapy:   What medications do you take or have you taken for your headaches? PREVENTIVE:    - Topamax extended release, Gabapentin 200 mg at bedtime,   - Tizanidine (lethargy)  ABORTIVE:    - Naproxen, ibuprofen  - Maxalt,  - Rey Ganesh  - Prochlorperazine  - Decadron     Aura and how long does it last - blurry vision with migraine - last only a few minutes     What is your current pain level - 0/10     How often do the headaches occur ?   Sharp and shooting headaches: 0-1 a month  Migraine headache - 0-1 times a month       What time of the day do the headaches start -   Sharp and shooting headaches: anytime of the day  Migraine headache : anytime of the day     How long do the headaches last -   Sharp and shooting headaches: 1-2 hours and without medication all day or can be 2 days if not effective (less time with the Ubrelvy)  Migraine headache - 2 hours to 2 days (less time with the Gregg Lints)     Where are they located -   Sharp and shooting headaches: right orbital pain on the lateral aspect, right temporal  Migraine headache - band-like over the forehead and back of the head, right temporal     What is the intensity of pain -   Sharp and shooting pain: 5-7/10  Migraine headaches: 5-8/10     Are you ever headache free - Yes      Describe your usual headache -   Sharp and shooting pain: like an ice-cream headache, stabbing  Migraine headaches:  throbbing and pounding     Associated symptoms:   · Photophobia, phonophobia  · Problem with concentration  · Blurred vision  · Lacrimation  · Tinnitus, light-headed  · Right hand tingles   · Prefer to be alone and in a dark room     Headache are worse if the patient: bending over, running or exercising     Headache trigger: none, perhaps menses     Have you used CBD or THC for your headaches and how often? No  How much caffeine to you consume per day? One cup of coffee per day  Non-Medical/Alternative Treatments used in the past for headaches: None     08/07/2018 MRI head with and without contrast:  No acute disease      I personally reviewed these images      Reviewed old notes from physician seen in the past- see above HPI for summary of previous encounters  Past Medical History:   Diagnosis Date    Concussion 04/28/2018    Headache     Migraine     Neck pain        Patient Active Problem List   Diagnosis    Tension headache, chronic    Atypical facial pain    Migraine without aura and without status migrainosus, not intractable    Dizziness and giddiness    Vitamin D deficiency    Persistent postural-perceptual dizziness    Functional neurological symptom disorder with mixed symptoms    H/O concussion    Menstrual migraine without status migrainosus, not intractable       Medications:      Current Outpatient Medications   Medication Sig Dispense Refill    cholecalciferol (VITAMIN D3) 1,000 units tablet Take 1,000 Units by mouth daily      Cyanocobalamin (VITAMIN B 12 PO) Take by mouth daily      fluticasone (FLONASE) 50 mcg/act nasal spray 2 sprays into each nostril as needed   5    ibuprofen (MOTRIN) 200 mg tablet Take 400 mg by mouth once as needed for mild pain      Trokendi  MG CP24 Take 1 capsule (100 mg total) by mouth daily 90 capsule 3    Ubrogepant (Ubrelvy) 50 MG tablet Take 1 tablet (50 mg total) by mouth as needed (migraine) May repeat in 2 hours if needed    Limit of 200 mg in 24 hours 10 tablet 3    prochlorperazine (COMPAZINE) 10 mg tablet Take 1 tablet (10 mg total) by mouth every 6 (six) hours as needed (migraine) (Patient not taking: Reported on 8/9/2021) 10 tablet 0     No current facility-administered medications for this visit  Allergies:    No Known Allergies    Family History:     Family History   Problem Relation Age of Onset   Areta Emmer Cancer Mother     ALS Father     Cancer Maternal Grandmother        Social History:     Social History     Socioeconomic History    Marital status: /Civil Union     Spouse name: Not on file    Number of children: Not on file    Years of education: Not on file    Highest education level: Not on file   Occupational History    Not on file   Tobacco Use    Smoking status: Never Smoker    Smokeless tobacco: Never Used   Vaping Use    Vaping Use: Never used   Substance and Sexual Activity    Alcohol use: Yes     Comment: socially     Drug use: No    Sexual activity: Not on file   Other Topics Concern    Not on file   Social History Narrative    Not on file     Social Determinants of Health     Financial Resource Strain:     Difficulty of Paying Living Expenses:    Food Insecurity:     Worried About Running Out of Food in the Last Year:     [address] in the Last Year:    Transportation Needs:     Lack of Transportation (Medical):      Lack of Transportation (Non-Medical):    Physical Activity:     Days of Exercise per Week:     Minutes of Exercise per Session:    Stress:     Feeling of Stress :    Social Connections:     Frequency of Communication with Friends and Family:     Frequency of Social Gatherings with Friends and Family:     Attends Alevism Services:     Active Member of Clubs or Organizations:     Attends Club or Organization Meetings:     Marital Status:    Intimate Partner Violence:     Fear of Current or Ex-Partner:     Emotionally Abused:     Physically Abused:     Sexually Abused:       I have reviewed the patient's medical, social and surgical history as well as medications in detail and updated the computerized patient record  Objective:   Physical Exam:                                                                   Vitals:            /59 (BP Location: Left arm, Patient Position: Sitting, Cuff Size: Standard)   Pulse 64   Temp 98 °F (36 7 °C) (Temporal)   Ht 5' 3" (1 6 m)   Wt 72 9 kg (160 lb 12 8 oz)   BMI 28 48 kg/m²   BP Readings from Last 3 Encounters:   08/09/21 107/59   03/03/20 114/75   12/27/19 103/61     Pulse Readings from Last 3 Encounters:   08/09/21 64   03/03/20 76   12/27/19 69          CONSTITUTIONAL: Well developed, well nourished, well groomed  No dysmorphic features  Eyes:  EOM normal      Neck:  Normal ROM, neck supple  HEENT:  Normocephalic atraumatic  Chest:  Respirations regular and unlabored  Psychiatric:  Normal behavior and appropriate affect      MENTAL STATUS  Orientation: Alert and oriented x 3  Fund of knowledge: Intact  MOTOR (Upper and lower extremities)   Bulk/tone/abnormal movement: Normal muscle bulk and tone  COORDINATION   Station/Gait: Normal baseline gait  Review of Systems:   Review of Systems  Constitutional: Negative  HENT: Negative  Eyes: Negative  Respiratory: Negative  Cardiovascular: Negative  Gastrointestinal: Negative  Endocrine: Negative  Genitourinary: Negative  Musculoskeletal: Negative  Skin: Negative  Allergic/Immunologic: Negative  Neurological: Positive for headaches  Hematological: Negative  Psychiatric/Behavioral: Negative      I personally reviewed the ROS entered by the MA    I spent 20 minutes in face-to-face discussion regarding  the pathophysiology of her current symptoms and further plan, as well as counseling, educating, and coordinating the patient's care including prognosis of diagnosis, diagnostic results, impression, and recommendations, risks and benefits of treatment, instructions for disease self management, treatment instructions, follow up requirements and risk factors and risk reduction of disease and spent 15 minutes non-face to face    Author:  Elif Foss PA-C 8/9/2021 8:19 AM

## 2021-09-08 ENCOUNTER — TELEPHONE (OUTPATIENT)
Dept: NEUROLOGY | Facility: CLINIC | Age: 41
End: 2021-09-08

## 2021-09-08 NOTE — TELEPHONE ENCOUNTER
[facility] called and states that ubrelvy needs FERMIN CHRISTENSEN completed on [facility]  [facility]  Received immediate approval on [facility]    CaseId:[id number];Status:Approved; Review Type:Prior Auth; Coverage Start Date:08/09/2021; Coverage End Date:09/08/2022

## 2022-01-11 DIAGNOSIS — G43.009 MIGRAINE WITHOUT AURA AND WITHOUT STATUS MIGRAINOSUS, NOT INTRACTABLE: ICD-10-CM

## 2022-01-11 DIAGNOSIS — G50.1 ATYPICAL FACIAL PAIN: ICD-10-CM

## 2022-01-11 RX ORDER — TOPIRAMATE 100 MG/1
CAPSULE, EXTENDED RELEASE ORAL
Qty: 90 CAPSULE | Refills: 4 | Status: SHIPPED | OUTPATIENT
Start: 2022-01-11

## 2022-02-02 DIAGNOSIS — G43.009 MIGRAINE WITHOUT AURA AND WITHOUT STATUS MIGRAINOSUS, NOT INTRACTABLE: ICD-10-CM

## 2022-02-03 RX ORDER — UBROGEPANT 50 MG/1
TABLET ORAL
Qty: 10 TABLET | Refills: 3 | Status: SHIPPED | OUTPATIENT
Start: 2022-02-03 | End: 2022-08-09 | Stop reason: SDUPTHER

## 2022-08-09 ENCOUNTER — OFFICE VISIT (OUTPATIENT)
Dept: NEUROLOGY | Facility: CLINIC | Age: 42
End: 2022-08-09
Payer: COMMERCIAL

## 2022-08-09 VITALS
BODY MASS INDEX: 28.65 KG/M2 | WEIGHT: 161.7 LBS | DIASTOLIC BLOOD PRESSURE: 54 MMHG | HEART RATE: 57 BPM | HEIGHT: 63 IN | TEMPERATURE: 97.9 F | SYSTOLIC BLOOD PRESSURE: 99 MMHG

## 2022-08-09 DIAGNOSIS — G43.009 MIGRAINE WITHOUT AURA AND WITHOUT STATUS MIGRAINOSUS, NOT INTRACTABLE: ICD-10-CM

## 2022-08-09 PROCEDURE — 99214 OFFICE O/P EST MOD 30 MIN: CPT | Performed by: PHYSICIAN ASSISTANT

## 2022-08-09 RX ORDER — FLUTICASONE PROPIONATE 50 MCG
2 SPRAY, SUSPENSION (ML) NASAL AS NEEDED
Qty: 3 ML | Refills: 5 | Status: SHIPPED | OUTPATIENT
Start: 2022-08-09

## 2022-08-09 NOTE — PATIENT INSTRUCTIONS
Migraine headaches:  Continue taking trokendi xr 100mg at this time  At onset of migraine or Aura take Ubrelvy 50 mg  May repeat in 2 hours if needed  Limit of 200 mg in 24 hours  If you are needing to repeat the medication over 50% of the time or are not satisfied with the results, call for 100 mg tablets  May also use prochlorperazine at onset or with the [facility]  May use caffeine if needed to break headaches    Neck pain:   - We discussed the role of neck pathology and poor posture, with straightening of the normal cervical lordosis, in headaches  We discussed how tightening of the neck muscles can irritate the nerves in the occipital region of her head and cause or worsen head pain  We also discussed and demonstrated neck strengthening and relaxation exercises, as well as giving written instructions on these exercises  - We talked about the importance of good posture for improving shoulder, neck, and head pain  The patient was given visualization exercises for correcting posture, which patient will practice at home  If this simple exercise does not help improve the posture, we will consider formal physical therapy in the future  Medication overuse headaches:   - We discussed medication overuse headache [facility]) and how to avoid it in the future  It was explained that all analgesics have the potential to cause medication overuse headache [facility]) and analgesic overuse can negate the effectiveness of headache preventive measures  After successful [address] treatment, preventive medications for an underlying primary headache disorder have a greater chance for success  Avoid medications with narcotics, barbiturates, or caffeine in them as these can cause rebound headaches after very few doses and can interfere with other headache medicine efficacy  Taking any analgesics for more than 2-3 days a week can cause medication overuse headache       Reproductive age women: Should take folic acid daily when taking anti-seizure drugs especially Depakote  [place] Prescription Drug Monitoring Program report was reviewed and was appropriate      Headache management instructions  - When patient has a moderate to severe headache, they should seek rest, initiate relaxation and apply cold compresses to the head  - Maintain regular sleep schedule  Adults need at least 7-8 hours of uninterrupted a night  - Limit over the counter medications such as Tylenol, Ibuprofen, Aleve, Excedrin  (No more than 3 times a week)  - Maintain headache diary  We discussed an ROHIT for a smart phone is "Migraine fernando"  - Limit caffeine to 1-2 cups 8 to 16 oz a day or less  - Avoid dietary trigger  (aged cheese, peanuts, MSG, aspartame and nitrates)  - Patient is to have regular frequent meals to prevent headache onset  - Please drink at least 64 ounces of water a day to help remain hydrated  Please call with any questions or concerns   Office number is [phone]

## 2022-08-09 NOTE — ASSESSMENT & PLAN NOTE
Continue taking trokendi xr 100mg at this time  At onset of migraine or Aura take Ubrelvy 50 mg  May repeat in 2 hours if needed  Limit of 200 mg in 24 hours    If you are needing to repeat the medication over 50% of the time or are not satisfied with the results, call for 100 mg tablets  May also use prochlorperazine at onset or with the [facility]  May use caffeine if needed to break headaches

## 2022-08-09 NOTE — PROGRESS NOTES
[facility] [facility]  PATIENT:  William Navarro  MRN:  [id number]  :  1980  DATE OF SERVICE:  2022      Assessment/Plan:     Migraine without aura and without status migrainosus, not intractable  Continue taking trokendi xr 100mg at this time  At onset of migraine or Aura take Ubrelvy 50 mg  May repeat in 2 hours if needed  Limit of 200 mg in 24 hours  If you are needing to repeat the medication over 50% of the time or are not satisfied with the results, call for 100 mg tablets  May also use prochlorperazine at onset or with the [facility]  May use caffeine if needed to break headaches         Problem List Items Addressed This Visit        Cardiovascular and Mediastinum    Migraine without aura and without status migrainosus, not intractable     Continue taking trokendi xr 100mg at this time  At onset of migraine or Aura take Ubrelvy 50 mg  May repeat in 2 hours if needed  Limit of 200 mg in 24 hours  If you are needing to repeat the medication over 50% of the time or are not satisfied with the results, call for 100 mg tablets  May also use prochlorperazine at onset or with the [facility]  May use caffeine if needed to break headaches         Relevant Medications    fluticasone (FLONASE) 50 mcg/act nasal spray    Ubrogepant (Ubrelvy) 50 MG tablet              History of Present Illness: We had the pleasure of evaluating William Navarro in neurological follow up  today for headaches  As you know,  she is a 42 y o   right handed female  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 Less then 5 minutes to seconds  Associated: blurry vision  Treatment done:  PT completed and PT on her own at home    Following ENT at this time     Cervicalgia/occipital neck pain  How often does it occur? Once a week  How long does it last? 10 minutes  What time of the day does it occur? Middle of the night  Describe ear pain:  Sharp shooting  Associated: right occipital pain     Right facial numbness/pain and arm numbness:   - started after head injury in march of 2018   - she was told by her dentist that it is TMJ  she also has pain on the v2 distribution and it occurs with concentration  She states doing occular therapy and that may have made her symptoms worse       Migraine headache/Tension therapy:   What medications do you take or have you taken for your headaches? Current preventative:  Trokendi  Vitamin B12, vitamin-D  Current abortive:  Ubrelvy  Prochlorperazine    Prior Preventative:    Gabapentin 200 mg at bedtime,   Tizanidine (lethargy)  Prior Abortive:    Naproxen, ibuprofen  Maxalt,  Decadron     Aura and how long does it last - blurry vision with migraine - last only a few minutes     What is your current pain level - 0/10     How often do the headaches occur ?   Sharp and shooting headaches: 0-2 a month  Migraine headache - 0-1 times a month    Gets pain behind her eyes 1-2 times a month     What time of the day do the headaches start -   Sharp and shooting headaches: anytime of the day  Migraine headache : anytime of the day     How long do the headaches last -   Sharp and shooting headaches: 1-2 hours and without medication all day or can be 2 days if not effective (less time with the Ubrelvy)  Migraine headache - 2 hours to 2 days (less time with the Ubrelvy)     Where are they located -   Sharp and shooting headaches: right orbital pain on the lateral aspect, right temporal  Migraine headache - band-like over the forehead and back of the head, right temporal     What is the intensity of pain -   Sharp and shooting pain: 5-7/10  Migraine headaches: 5-8/10     Are you ever headache free - Yes      Describe your usual headache -   Sharp and shooting pain: like an ice-cream headache, stabbing  Migraine headaches:  throbbing and pounding     Associated symptoms:   Photophobia, phonophobia  Problem with concentration  Blurred vision  Lacrimation  Tinnitus, light-headed  Right hand tingles   Prefer to be alone and in a dark room     Headache are worse if the patient: bending over, running or exercising     Headache trigger: none, perhaps menses     Have you used CBD or THC for your headaches and how often? No  How much caffeine to you consume per day? One cup of coffee per day  Non-Medical/Alternative Treatments used in the past for headaches: None     08/07/2018 MRI head with and without contrast:  No acute disease      I personally reviewed these images         Reviewed old notes from physician seen in the past- see above HPI for summary of previous encounters         Past Medical History:   Diagnosis Date    Concussion 04/28/2018    Headache     Migraine     Neck pain        Patient Active Problem List   Diagnosis    Tension headache, chronic    Atypical facial pain    Migraine without aura and without status migrainosus, not intractable    Dizziness and giddiness    Vitamin D deficiency    Persistent postural-perceptual dizziness    Functional neurological symptom disorder with mixed symptoms    H/O concussion    Menstrual migraine without status migrainosus, not intractable       Medications:      Current Outpatient Medications   Medication Sig Dispense Refill    cholecalciferol (VITAMIN D3) 1,000 units tablet Take 1,000 Units by mouth daily      Cyanocobalamin (VITAMIN B 12 PO) Take 1 tablet by mouth daily      fluticasone (FLONASE) 50 mcg/act nasal spray 2 sprays into each nostril as needed for allergies 3 mL 5    ibuprofen (MOTRIN) 200 mg tablet Take 400 mg by mouth once as needed for mild pain      Trokendi  MG CP24 TAKE 1 CAPSULE BY MOUTH EVERY DAY 90 capsule 4    Ubrogepant (Ubrelvy) 50 MG tablet Take 1 tablet (50 mg total) by mouth if needed (migraine) Take 1 tablet (50 mg) one time as needed for migraine  May repeat one additional tablet (50 mg) at least two hours after the first dose  16 tablet 11    prochlorperazine (COMPAZINE) 10 mg tablet Take 1 tablet (10 mg total) by mouth every 6 (six) hours as needed (migraine) (Patient not taking: No sig reported) 10 tablet 0     No current facility-administered medications for this visit  Allergies:    No Known Allergies    Family History:     Family History   Problem Relation Age of Onset   Yuval Velazquez Cancer Mother     ALS Father     Cancer Maternal Grandmother        Social History:     Social History     Socioeconomic History    Marital status: /Civil Union     Spouse name: Not on file    Number of children: Not on file    Years of education: Not on file    Highest education level: Not on file   Occupational History    Not on file   Tobacco Use    Smoking status: Never Smoker    Smokeless tobacco: Never Used   Vaping Use    Vaping Use: Never used   Substance and Sexual Activity    Alcohol use: Yes     Comment: socially     Drug use: No    Sexual activity: Not on file   Other Topics Concern    Not on file   Social History Narrative    Not on file     Social Determinants of Health     Financial Resource Strain: Not on file   Food Insecurity: Not on file   Transportation Needs: Not on file   Physical Activity: Not on file   Stress: Not on file   Social Connections: Not on file   Intimate Partner Violence: Not on file   Housing Stability: Not on file      I have reviewed the patient's medical, social and surgical history as well as medications in detail and updated the computerized patient record        Objective:   Physical Exam:                                                                   Vitals:            BP 99/54 (BP Location: Left arm, Patient Position: Sitting, Cuff Size: Standard)   Pulse 57   Temp 97 9 °F (36 6 °C) (Temporal)   Ht 5' 3" (1 6 m)   Wt 73 3 kg (161 lb 11 2 oz)   BMI 28 64 kg/m²   BP Readings from Last 3 Encounters:   08/09/22 99/54   08/09/21 107/59   03/03/20 114/75     Pulse Readings from Last 3 Encounters:   08/09/22 57   08/09/21 64   03/03/20 76          CONSTITUTIONAL: Well developed, well nourished, well groomed  No dysmorphic features  Eyes:  EOM normal      Neck:  Normal ROM, neck supple  HEENT:  Normocephalic atraumatic  Chest:  Respirations regular and unlabored  Psychiatric:  Normal behavior and appropriate affect      MENTAL STATUS  Orientation: Alert and oriented x 3  Fund of knowledge: Intact  MOTOR (Upper and lower extremities)   Bulk/tone/abnormal movement: Normal muscle bulk and tone  COORDINATION   Station/Gait: Normal baseline gait  Review of Systems:   Review of Systems  Constitutional: Negative  HENT: Negative  Eyes: Negative  Respiratory: Negative  Cardiovascular: Negative  Gastrointestinal: Negative  Endocrine: Negative  Genitourinary: Negative  Musculoskeletal: Negative  Skin: Negative  Allergic/Immunologic: Negative  Neurological: Positive for headaches  Hematological: Negative  Psychiatric/Behavioral: Negative     I personally reviewed the ROS entered by the MA    I spent 38 minutes in total time for this visit  Author:  Santos Mike PA-C 8/9/2022 10:20 AM

## 2023-08-09 ENCOUNTER — OFFICE VISIT (OUTPATIENT)
Dept: NEUROLOGY | Facility: CLINIC | Age: 43
End: 2023-08-09

## 2023-08-09 VITALS
BODY MASS INDEX: 28.65 KG/M2 | WEIGHT: 161.7 LBS | DIASTOLIC BLOOD PRESSURE: 68 MMHG | HEIGHT: 63 IN | SYSTOLIC BLOOD PRESSURE: 104 MMHG | HEART RATE: 65 BPM | TEMPERATURE: 97.6 F

## 2023-08-09 DIAGNOSIS — G43.009 MIGRAINE WITHOUT AURA AND WITHOUT STATUS MIGRAINOSUS, NOT INTRACTABLE: ICD-10-CM

## 2023-08-09 RX ORDER — ACETAZOLAMIDE 125 MG/1
TABLET ORAL
Qty: 120 TABLET | Refills: 2 | Status: SHIPPED | OUTPATIENT
Start: 2023-08-09

## 2023-08-09 RX ORDER — TOPIRAMATE 50 MG/1
1 CAPSULE, EXTENDED RELEASE ORAL DAILY
Qty: 30 CAPSULE | Refills: 0 | Status: SHIPPED | OUTPATIENT
Start: 2023-08-09

## 2023-08-09 NOTE — PROGRESS NOTES
Review of Systems   Constitutional: Negative. HENT: Negative. Eyes: Negative. Respiratory: Negative. Cardiovascular: Negative. Gastrointestinal: Negative. Endocrine: Negative. Genitourinary: Negative. Musculoskeletal: Negative. Skin: Negative. Allergic/Immunologic: Negative. Neurological: Positive for headaches. Hematological: Negative. Psychiatric/Behavioral: Negative.

## 2023-08-09 NOTE — PATIENT INSTRUCTIONS
Migraine headaches:  Decrease taking trokendi xr 50mg for 7 days then stop    Start acetazolamide 125 mg twice a day (second dose is late afternoon/early evening) for 7 days. Increase to 2 tabs twice a day. At onset of migraine or Aura take Ubrelvy 100 mg. May repeat in 2 hours if needed. Limit of 200 mg in 24 hours. May also use prochlorperazine at onset or with the [facility]  May use caffeine if needed to break headaches    Neck pain:   - We discussed the role of neck pathology and poor posture, with straightening of the normal cervical lordosis, in headaches. We discussed how tightening of the neck muscles can irritate the nerves in the occipital region of her head and cause or worsen head pain. We also discussed and demonstrated neck strengthening and relaxation exercises, as well as giving written instructions on these exercises. - We talked about the importance of good posture for improving shoulder, neck, and head pain. The patient was given visualization exercises for correcting posture, which patient will practice at home. If this simple exercise does not help improve the posture, we will consider formal physical therapy in the future. Medication overuse headaches:   - We discussed medication overuse headache [facility]) and how to avoid it in the future. It was explained that all analgesics have the potential to cause medication overuse headache [facility]) and analgesic overuse can negate the effectiveness of headache preventive measures. After successful [address] treatment, preventive medications for an underlying primary headache disorder have a greater chance for success. Avoid medications with narcotics, barbiturates, or caffeine in them as these can cause rebound headaches after very few doses and can interfere with other headache medicine efficacy. Taking any analgesics for more than 2-3 days a week can cause medication overuse headache.      Reproductive age women: Should take folic acid daily when taking anti-seizure drugs especially Depakote. [place] Prescription Drug Monitoring Program report was reviewed and was appropriate      Headache management instructions  - When patient has a moderate to severe headache, they should seek rest, initiate relaxation and apply cold compresses to the head. - Maintain regular sleep schedule. Adults need at least 7-8 hours of uninterrupted a night. - Limit over the counter medications such as Tylenol, Ibuprofen, Aleve, Excedrin. (No more than 3 times a week). - Maintain headache diary. We discussed an ROHIT for a smart phone is "Migraine fernando"  - Limit caffeine to 1-2 cups 8 to 16 oz a day or less. - Avoid dietary trigger. (aged cheese, peanuts, MSG, aspartame and nitrates). - Patient is to have regular frequent meals to prevent headache onset. - Please drink at least 64 ounces of water a day to help remain hydrated. Please call with any questions or concerns.  Office number is [phone]

## 2023-08-09 NOTE — PROGRESS NOTES
[facility]  PATIENT:  Mohsen Agrawal  MRN:  [id number]  :  1980  DATE OF SERVICE:  2023      Assessment/Plan:     Migraine without aura and without status migrainosus, not intractable  Decrease taking trokendi xr 50mg for 7 days then stop    Start acetazolamide 125 mg twice a day (second dose is late afternoon/early evening) for 7 days. Increase to 2 tabs twice a day. At onset of migraine or Aura take Ubrelvy 100 mg. May repeat in 2 hours if needed. Limit of 200 mg in 24 hours. May also use prochlorperazine at onset or with the [facility]  May use caffeine if needed to break headaches           Problem List Items Addressed This Visit        Cardiovascular and Mediastinum    Migraine without aura and without status migrainosus, not intractable     Decrease taking trokendi xr 50mg for 7 days then stop    Start acetazolamide 125 mg twice a day (second dose is late afternoon/early evening) for 7 days. Increase to 2 tabs twice a day. At onset of migraine or Aura take Ubrelvy 100 mg. May repeat in 2 hours if needed. Limit of 200 mg in 24 hours. May also use prochlorperazine at onset or with the [facility]  May use caffeine if needed to break headaches         Relevant Medications    Ubrogepant (Ubrelvy) 100 MG tablet    acetaZOLAMIDE (DIAMOX) 125 mg tablet    Topiramate ER (Trokendi XR) 50 MG CP24    Other Relevant Orders    CBC and Platelet    Comprehensive metabolic panel           History of Present Illness: We had the pleasure of evaluating Mohsen Agrawal in neurological follow up  today for headaches. As you know,  she is a 36 y.o.  right handed female. 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      Her headaches go in waves. 2 months ago felt like was getting excessively but then other months is worse.   She is very senstisitive to weather changes and her menses.       Current medical illnesses:  QTc 12/27/2019 401 ms     Light headed/dizziness:  When does it occurs: when she is bending own   How long does it last? Less then 5 minutes to seconds  Associated: blurry vision  Treatment done:  PT completed and PT on her own at home.   Following ENT at this time     Cervicalgia/occipital neck pain. How often does it occur? Once a week  How long does it last? 10 minutes  What time of the day does it occur? Middle of the night  Describe ear pain:  Sharp shooting  Associated: right occipital pain     Right facial numbness/pain and arm numbness:   - started after head injury in march of 2018   - she was told by her dentist that it is TMJ. she also has pain on the v2 distribution and it occurs with concentration. She states doing occular therapy and that may have made her symptoms worse.      Migraine headache/Tension therapy:   What medications do you take or have you taken for your headaches? Current preventative:  Trokendi  Vitamin B12, vitamin-D  Current abortive:  Ubrelvy  Prochlorperazine     Prior Preventative:    Gabapentin 200 mg at bedtime,   Tizanidine (lethargy)  Prior Abortive:    Naproxen, ibuprofen  Maxalt,  Decadron     Aura and how long does it last - blurry vision with migraine - last only a few minutes     What is your current pain level - 0/10     How often do the headaches occur ?   Sharp and shooting headaches: 2 a month  Migraine headache - 2 a month    Gets pain behind her eyes 2 times a month     What time of the day do the headaches start -   Sharp and shooting headaches: anytime of the day  Migraine headache : anytime of the day     How long do the headaches last -   Sharp and shooting headaches: 1-2 hours and without medication all day or can be 2 days if not effective (less time with the Ubrelvy)  Migraine headache - 2 hours to 1 days (less time with the Ubrelvy)     Where are they located -   Sharp and shooting headaches: right orbital pain on the lateral aspect, right temporal  Migraine headache - band-like over the forehead and back of the head, right temporal     What is the intensity of pain -   Sharp and shooting pain: 5-7/10  Migraine headaches: 5-8/10     Are you ever headache free - Yes      Describe your usual headache -   Sharp and shooting pain: like an ice-cream headache, stabbing  Migraine headaches:  throbbing and pounding     Associated symptoms:   Photophobia, phonophobia  Problem with concentration  Blurred vision  Lacrimation  Tinnitus, light-headed  Right hand tingles   Prefer to be alone and in a dark room     Headache are worse if the patient: bending over, running or exercising     Headache trigger: none, perhaps menses     Have you used CBD or THC for your headaches and how often? No  How much caffeine to you consume per day? One cup of coffee per day  Non-Medical/Alternative Treatments used in the past for headaches: None     08/07/2018 MRI head with and without contrast:  No acute disease   as of 8/9/2023 when review these images with patient there is low lying cerebral tonsils as well as partial empty sella     I personally reviewed these images.        Reviewed old notes from physician seen in the past- see above HPI for summary of previous encounters.        Past Medical History:   Diagnosis Date   • Concussion 04/28/2018   • Headache    • Migraine    • Neck pain        Patient Active Problem List   Diagnosis   • Tension headache, chronic   • Atypical facial pain   • Migraine without aura and without status migrainosus, not intractable   • Dizziness and giddiness   • Vitamin D deficiency   • Persistent postural-perceptual dizziness   • Functional neurological symptom disorder with mixed symptoms   • H/O concussion   • Menstrual migraine without status migrainosus, not intractable       Medications:      Current Outpatient Medications   Medication Sig Dispense Refill   • acetaZOLAMIDE (DIAMOX) 125 mg tablet 1 tab twice a day (second dose late afternoon/evening) for 7 days then increase to 2 tabs twice a day 120 tablet 2   • fluticasone (FLONASE) 50 mcg/act nasal spray 2 sprays into each nostril as needed for allergies 3 mL 5   • Topiramate ER (Trokendi XR) 50 MG CP24 Take 1 capsule (50 mg total) by mouth in the morning 30 capsule 0   • Trokendi  MG CP24 TAKE 1 CAPSULE BY MOUTH EVERY DAY 90 capsule 4   • Ubrogepant (Ubrelvy) 100 MG tablet Take 1 tablet (100 mg total) by mouth if needed (migraine) Take 1 tablet (50 mg) one time as needed for migraine. May repeat one additional tablet (50 mg) at least two hours after the first dose. 16 tablet 11   • cholecalciferol (VITAMIN D3) 1,000 units tablet Take 1,000 Units by mouth daily (Patient not taking: Reported on 8/9/2023)     • Cyanocobalamin (VITAMIN B 12 PO) Take 1 tablet by mouth daily (Patient not taking: Reported on 8/9/2023)     • ibuprofen (MOTRIN) 200 mg tablet Take 400 mg by mouth once as needed for mild pain (Patient not taking: Reported on 8/9/2023)     • prochlorperazine (COMPAZINE) 10 mg tablet Take 1 tablet (10 mg total) by mouth every 6 (six) hours as needed (migraine) (Patient not taking: Reported on 8/9/2021) 10 tablet 0     No current facility-administered medications for this visit.         Allergies:    No Known Allergies    Family History:     Family History   Problem Relation Age of Onset   • Cancer Mother    • ALS Father    • Cancer Maternal Grandmother        Social History:     Social History     Socioeconomic History   • Marital status: /Civil Union     Spouse name: Not on file   • Number of children: Not on file   • Years of education: Not on file   • Highest education level: Not on file   Occupational History   • Not on file   Tobacco Use   • Smoking status: Never   • Smokeless tobacco: Never   Vaping Use   • Vaping Use: Never used   Substance and Sexual Activity   • Alcohol use: Yes     Comment: socially    • Drug use: No   • Sexual activity: Not on file   Other Topics Concern   • Not on file   Social History Narrative   • Not on file     Social Determinants of Health     Financial Resource Strain: Not on file   Food Insecurity: Not on file   Transportation Needs: Not on file   Physical Activity: Not on file   Stress: Not on file   Social Connections: Not on file   Intimate Partner Violence: Not on file   Housing Stability: Not on file      I have reviewed the patient's medical, social and surgical history as well as medications in detail and updated the computerized patient record. Objective:   Physical Exam:                                                                   Vitals:            /68 (BP Location: Left arm, Patient Position: Sitting, Cuff Size: Standard)   Pulse 65   Temp 97.6 °F (36.4 °C) (Temporal)   Ht 5' 3" (1.6 m)   Wt 73.3 kg (161 lb 11.2 oz)   BMI 28.64 kg/m²   BP Readings from Last 3 Encounters:   08/09/23 104/68   08/09/22 99/54   08/09/21 107/59     Pulse Readings from Last 3 Encounters:   08/09/23 65   08/09/22 57   08/09/21 64          CONSTITUTIONAL: Well developed, well nourished, well groomed. No dysmorphic features. Eyes:  EOM normal      Neck:  Normal ROM, neck supple. HEENT:  Normocephalic atraumatic. Chest:  Respirations regular and unlabored. Psychiatric:  Normal behavior and appropriate affect      MENTAL STATUS  Orientation: Alert and oriented x 3  Fund of knowledge: Intact. MOTOR (Upper and lower extremities)   Bulk/tone/abnormal movement: Normal muscle bulk and tone. COORDINATION   Station/Gait: Normal baseline gait. Review of Systems:   Review of Systems  Constitutional: Negative. HENT: Negative. Eyes: Negative. Respiratory: Negative. Cardiovascular: Negative. Gastrointestinal: Negative. Endocrine: Negative. Genitourinary: Negative. Musculoskeletal: Negative. Skin: Negative. Allergic/Immunologic: Negative. Neurological: Positive for headaches.    Hematological: Negative. Psychiatric/Behavioral: Negative. I personally reviewed the ROS entered by the MA      I have spent a total time of 35 minutes on 08/09/23 in caring for this patient including Diagnostic results, Prognosis, Risks and benefits of tx options, Instructions for management, Patient and family education, Risk factor reductions, Impressions, Counseling / Coordination of care, Documenting in the medical record, Reviewing / ordering tests, medicine, procedures   and Obtaining or reviewing history  .       Author:  George Alcantar PA-C 8/9/2023 1:45 PM

## 2023-09-02 DIAGNOSIS — G43.009 MIGRAINE WITHOUT AURA AND WITHOUT STATUS MIGRAINOSUS, NOT INTRACTABLE: ICD-10-CM

## 2023-09-05 RX ORDER — ACETAZOLAMIDE 125 MG/1
250 TABLET ORAL 2 TIMES DAILY
Qty: 360 TABLET | Refills: 1 | Status: SHIPPED | OUTPATIENT
Start: 2023-09-05

## 2023-09-07 ENCOUNTER — TELEPHONE (OUTPATIENT)
Dept: NEUROLOGY | Facility: CLINIC | Age: 43
End: 2023-09-07

## 2023-09-07 NOTE — TELEPHONE ENCOUNTER
Reuben villa   My name is Juan Manuel Madrigal. I was recently in for my yearly visit and I talked to Oscar  about a prescription update and we reviewed my prescriptions. However, the pharmacy is having difficulty filling my prescription. I guess my insurance is saying it needs  prior authorization, and I don't know what that means and they tried to fill it and they are just waiting on an authorization. So I don't know how to proceed with that. trying to reduce the dose. my number is [phone]. I spoke to patient; medication is trokendi; she said she had not problems filling when dose was 100 mg; however now that it's reduced to 50 mg she is unable to fill; said has one week supply left of the 100 mg.    I called pharmacy and they confirmed PA needed:    [employer] [id number]  [facility]  [id number] [facility]  [place] [id number]  [phone]    Please submit, thank you.

## 2023-10-12 DIAGNOSIS — G43.009 MIGRAINE WITHOUT AURA AND WITHOUT STATUS MIGRAINOSUS, NOT INTRACTABLE: ICD-10-CM

## 2023-10-12 RX ORDER — TOPIRAMATE 50 MG/1
1 CAPSULE, EXTENDED RELEASE ORAL EVERY MORNING
Qty: 30 CAPSULE | Refills: 0 | Status: SHIPPED | OUTPATIENT
Start: 2023-10-12

## 2024-05-07 DIAGNOSIS — G43.009 MIGRAINE WITHOUT AURA AND WITHOUT STATUS MIGRAINOSUS, NOT INTRACTABLE: ICD-10-CM

## 2024-05-08 RX ORDER — FLUTICASONE PROPIONATE 50 MCG
SPRAY, SUSPENSION (ML) NASAL
Qty: 48 ML | Refills: 5 | Status: SHIPPED | OUTPATIENT
Start: 2024-05-08

## 2024-08-29 DIAGNOSIS — G43.009 MIGRAINE WITHOUT AURA AND WITHOUT STATUS MIGRAINOSUS, NOT INTRACTABLE: ICD-10-CM

## 2024-08-29 NOTE — TELEPHONE ENCOUNTER
Reason for call:   [x] Refill   [] Prior Auth  [] Other:     Office:   [] PCP/Provider -   [x] Specialty/Provider - Neurology    Medication: Ubrogepant (Ubrelvy) 100 MG tablet Take 1 tablet (100 mg total) by mouth if needed (migraine) Take 1 tablet (50 mg) one time as needed for migraine. May repeat one additional tablet (50 mg) at least two hours after the first dose       Pharmacy: [id number] [place] [facility] [address]  [address] [facility]      Does the patient have enough for 3 days?   [] Yes   [x] No - Send as HP to POD

## 2024-09-16 ENCOUNTER — TELEPHONE (OUTPATIENT)
Age: 44
End: 2024-09-16

## 2024-09-16 NOTE — TELEPHONE ENCOUNTER
Pt called checking to see if has an open slot on 10/3 or 10/4 she is off from work on these days. After reviewing, advised at this time no open slots. Added pt to wait list with note please move this pt if has open slot on 10/3 & 10/4. Also advised pt she can call to see if an slot has became open. Pt understood.

## 2024-09-23 NOTE — TELEPHONE ENCOUNTER
Patient called rx refill line inquiring for status of refill request x 2 weeks  Patient is out of medication

## 2024-10-18 ENCOUNTER — OFFICE VISIT (OUTPATIENT)
Dept: NEUROLOGY | Facility: CLINIC | Age: 44
End: 2024-10-18
Payer: COMMERCIAL

## 2024-10-18 VITALS
HEART RATE: 70 BPM | WEIGHT: 160.2 LBS | BODY MASS INDEX: 28.39 KG/M2 | HEIGHT: 63 IN | TEMPERATURE: 98.5 F | DIASTOLIC BLOOD PRESSURE: 73 MMHG | SYSTOLIC BLOOD PRESSURE: 111 MMHG

## 2024-10-18 DIAGNOSIS — G43.009 MIGRAINE WITHOUT AURA AND WITHOUT STATUS MIGRAINOSUS, NOT INTRACTABLE: ICD-10-CM

## 2024-10-18 PROCEDURE — 99215 OFFICE O/P EST HI 40 MIN: CPT | Performed by: PHYSICIAN ASSISTANT

## 2024-10-18 RX ORDER — FLUTICASONE PROPIONATE 50 MCG
2 SPRAY, SUSPENSION (ML) NASAL DAILY
Qty: 48 ML | Refills: 5 | Status: SHIPPED | OUTPATIENT
Start: 2024-10-18

## 2024-10-18 RX ORDER — ACETAZOLAMIDE 125 MG/1
250 TABLET ORAL 2 TIMES DAILY
Qty: 360 TABLET | Refills: 1 | Status: SHIPPED | OUTPATIENT
Start: 2024-10-18

## 2024-10-18 NOTE — PROGRESS NOTES
Neurology Ambulatory Visit  Name: Magui Antonio       : 1980       MRN: [id number]   Encounter Provider: Krystyna Morfin PA-C   Encounter Date: 10/18/2024  Encounter department: NEUROLOGY [place] [address]. Migraine without aura and without status migrainosus, not intractable  Assessment & Plan:  Use acetazolamide 250 mg twice a day (second dose is late afternoon/early evening) during spring and fall.  May also use for travel as needed.    At onset of migraine or Aura take Ubrelvy 100 mg.  May repeat in 2 hours if needed.  Limit of 200 mg in 24 hours.    May also use prochlorperazine at onset or with the ubrelvy  May use caffeine if needed to break headaches    Use the flonase  2 sprays daily  Orders:  -     acetaZOLAMIDE (DIAMOX) 125 mg tablet; Take 2 tablets (250 mg total) by mouth 2 (two) times a day  -     fluticasone (FLONASE) 50 mcg/act nasal spray; 2 sprays into each nostril daily  -     Ubrogepant (Ubrelvy) 100 MG tablet; Take 1 tablet (100 mg total) by mouth every 12 (twelve) hours as needed (migraine) Limit of 200 mg in 24 hours      History of Present Illness     HPI   Magui Antonio is a 44 y.o. 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      Her headaches go in waves.  She is very senstisitive to weather changes and her menses.       Current medical illnesses:  QTc 2019 401 ms     Light headed/dizziness:  When does it occurs: when she is bending own   How long does it last? Less then 5 minutes to seconds  Associated: blurry vision  Treatment done:  PT completed and PT on her own at home.   Following ENT at this time     Cervicalgia/occipital neck pain.   How often does it occur? Once a week  How long does it last? 10 minutes  What time of the day does it occur? Middle of the night  Describe ear pain:  Sharp shooting  Associated: right occipital pain     Right  facial numbness/pain and arm numbness:   - started after head injury in march of 2018   - she was told by her dentist that it is TMJ. she also has pain on the v2 distribution and it occurs with concentration. She states doing occular therapy and that may have made her symptoms worse.      Migraine headache/Tension therapy:   What medications do you take or have you taken for your headaches?   Current preventative:  Acetazolamide (stopped in summer)  Vitamin B12, vitamin-D    Current abortive:  Ubrelvy  Prochlorperazine     Prior Preventative:    Gabapentin 200 mg at bedtime,   Tizanidine (lethargy)  Acetazolamide  Trokendi    Prior Abortive:    Naproxen, ibuprofen  Maxalt,  Decadron     Aura and how long does it last - blurry vision with migraine - last only a few minutes     What is your current pain level - 0/10     How often do the headaches occur ?  Sharp and shooting headaches: 1 a month  Migraine headache - 1 a month       What time of the day do the headaches start -   Sharp and shooting headaches: anytime of the day  Migraine headache : anytime of the day     How long do the headaches last -   Sharp and shooting headaches: 1-2 hours and without medication all day or can be 2 days if not effective (less time with the Ubrelvy)  Migraine headache - 2 hours to 1 days (less time with the Ubrelvy)     Where are they located -   Sharp and shooting headaches: right orbital pain on the lateral aspect, right temporal  Migraine headache - band-like over the forehead and back of the head, right temporal     What is the intensity of pain -   Sharp and shooting pain: 8/10  Migraine headaches: 8/10     Are you ever headache free - Yes      Describe your usual headache -   Sharp and shooting pain: like an ice-cream headache, stabbing  Migraine headaches:  throbbing and pounding     Associated symptoms:   Photophobia, phonophobia  Problem with concentration  Blurred vision  Lacrimation  Tinnitus, light-headed  Right hand  "tingles   Prefer to be alone and in a dark room     Headache are worse if the patient: bending over, running or exercising     Headache trigger: none, perhaps menses     Have you used CBD or THC for your headaches and how often? No  How much caffeine to you consume per day? One cup of coffee per day  Non-Medical/Alternative Treatments used in the past for headaches: None     08/07/2018 MRI head with and without contrast:  No acute disease   as of 8/9/2023 when review these images with patient there is low lying cerebral tonsils as well as partial empty sella      I personally reviewed these images.        Reviewed old notes from physician seen in the past- see above HPI for summary of previous encounters.      Review of Systems      Objective     /73 (BP Location: Right arm, Patient Position: Sitting, Cuff Size: Standard)   Pulse 70   Temp 98.5 °F (36.9 °C) (Temporal)   Ht 5' 3\" (1.6 m)   Wt 72.7 kg (160 lb 3.2 oz)   BMI 28.38 kg/m²    Physical Exam  Neurologic Exam  CONSTITUTIONAL: Well developed, well nourished, well groomed. No dysmorphic features.     Eyes:  EOM normal      Neck:  Normal ROM, neck supple.      HEENT:  Normocephalic atraumatic.    Chest:  Respirations regular and unlabored.    Psychiatric:  Normal behavior and appropriate affect      MENTAL STATUS  Orientation: Alert and oriented x 3  Fund of knowledge: Intact.    MOTOR (Upper and lower extremities)   Bulk/tone/abnormal movement: Normal muscle bulk and tone.      COORDINATION   Station/Gait: Normal baseline gait.    Administrative Statements   I have spent a total time of 42 minutes in caring for this patient on the day of the visit/encounter including Prognosis, Risks and benefits of tx options, Instructions for management, Patient and family education, Importance of tx compliance, Risk factor reductions, Impressions, Counseling / Coordination of care, Documenting in the medical record, Reviewing / ordering tests, medicine, procedures "  , and Obtaining or reviewing history  .

## 2024-10-18 NOTE — ASSESSMENT & PLAN NOTE
Use acetazolamide 250 mg twice a day (second dose is late afternoon/early evening) during spring and fall.  May also use for travel as needed.    At onset of migraine or Aura take Ubrelvy 100 mg.  May repeat in 2 hours if needed.  Limit of 200 mg in 24 hours.    May also use prochlorperazine at onset or with the ubrelvy  May use caffeine if needed to break headaches    Use the flonase  2 sprays daily

## 2024-10-18 NOTE — PATIENT INSTRUCTIONS
1. Migraine without aura and without status migrainosus, not intractable  Assessment & Plan:  Use acetazolamide 250 mg twice a day (second dose is late afternoon/early evening) during spring and fall.  May also use for travel as needed.    At onset of migraine or Aura take Ubrelvy 100 mg.  May repeat in 2 hours if needed.  Limit of 200 mg in 24 hours.    May also use prochlorperazine at onset or with the ubrelvy  May use caffeine if needed to break headaches    Use the flonase  2 sprays daily

## 2025-07-08 ENCOUNTER — TELEPHONE (OUTPATIENT)
Age: 45
End: 2025-07-08

## 2025-07-08 NOTE — TELEPHONE ENCOUNTER
PA for Ubrelvy 100 MG tablet SUBMITTED to [employer] [facility]     via    []CMM-KEY:   [x]Surescripts-Case ID # [id number]   []Availity-Auth ID # NDC #   []Faxed to plan   []Other website   []Phone call Case ID #     []PA sent as URGENT    All office notes, labs and other pertaining documents and studies sent. Clinical questions answered. Awaiting determination from insurance company.     Turnaround time for your insurance to make a decision on your Prior Authorization can take 7-21 business days.

## 2025-07-10 NOTE — TELEPHONE ENCOUNTER
PA for Ubrelvy 100mg tab DENIED    Reason:        Message sent to office clinical pool Yes    Denial letter scanned into Media Yes    We can gladly do an appeal but the process can take about 30-60 days to provide determination. Please have the office staff schedule a Peer to Peer at phone [phone]  . If an appeal is truly warranted please have Provider send clinical documentation to the PA department to support the appeal.     **Please follow up with your patient regarding denial and next steps**

## 2025-07-10 NOTE — TELEPHONE ENCOUNTER
Called pt and made her aware of below.  She states that she thinks that she tried 2 triptans.   Advised that I only see rizatriptan listed on med list and office note.  She states that she will contact her pharm to see if they have listed any other triptans.  She will call back.  She also stated that she just picked ubrelvy up from the pharmacy

## 2025-07-10 NOTE — TELEPHONE ENCOUNTER
Ok sorry, can you please ask her if she has tried before or is willing to try one of those? Thanks.